# Patient Record
Sex: FEMALE | Race: WHITE | NOT HISPANIC OR LATINO | Employment: OTHER | URBAN - METROPOLITAN AREA
[De-identification: names, ages, dates, MRNs, and addresses within clinical notes are randomized per-mention and may not be internally consistent; named-entity substitution may affect disease eponyms.]

---

## 2017-01-04 ENCOUNTER — RESOLUTE PROFESSIONAL BILLING HOSPITAL PROF FEE (OUTPATIENT)
Dept: HOSPITALIST | Facility: MEDICAL CENTER | Age: 72
End: 2017-01-04
Payer: MEDICARE

## 2017-01-04 ENCOUNTER — HOSPITAL ENCOUNTER (INPATIENT)
Facility: MEDICAL CENTER | Age: 72
LOS: 6 days | DRG: 065 | End: 2017-01-10
Attending: INTERNAL MEDICINE | Admitting: INTERNAL MEDICINE
Payer: MEDICARE

## 2017-01-04 ENCOUNTER — HOSPITAL ENCOUNTER (OUTPATIENT)
Dept: RADIOLOGY | Facility: MEDICAL CENTER | Age: 72
End: 2017-01-04

## 2017-01-04 DIAGNOSIS — I63.9 ACUTE CVA (CEREBROVASCULAR ACCIDENT) (HCC): ICD-10-CM

## 2017-01-04 PROCEDURE — 770020 HCHG ROOM/CARE - TELE (206)

## 2017-01-04 PROCEDURE — 99223 1ST HOSP IP/OBS HIGH 75: CPT | Mod: AI | Performed by: INTERNAL MEDICINE

## 2017-01-04 PROCEDURE — A9270 NON-COVERED ITEM OR SERVICE: HCPCS | Performed by: INTERNAL MEDICINE

## 2017-01-04 PROCEDURE — 700102 HCHG RX REV CODE 250 W/ 637 OVERRIDE(OP): Performed by: INTERNAL MEDICINE

## 2017-01-04 RX ORDER — BISACODYL 10 MG
10 SUPPOSITORY, RECTAL RECTAL
Status: DISCONTINUED | OUTPATIENT
Start: 2017-01-05 | End: 2017-01-10 | Stop reason: HOSPADM

## 2017-01-04 RX ORDER — AMOXICILLIN 250 MG
1 CAPSULE ORAL
Status: DISCONTINUED | OUTPATIENT
Start: 2017-01-05 | End: 2017-01-10 | Stop reason: HOSPADM

## 2017-01-04 RX ORDER — ENEMA 19; 7 G/133ML; G/133ML
1 ENEMA RECTAL
Status: DISCONTINUED | OUTPATIENT
Start: 2017-01-05 | End: 2017-01-10 | Stop reason: HOSPADM

## 2017-01-04 RX ORDER — ONDANSETRON 4 MG/1
4 TABLET, ORALLY DISINTEGRATING ORAL EVERY 4 HOURS PRN
Status: DISCONTINUED | OUTPATIENT
Start: 2017-01-04 | End: 2017-01-10 | Stop reason: HOSPADM

## 2017-01-04 RX ORDER — AMOXICILLIN 250 MG
1 CAPSULE ORAL NIGHTLY
Status: DISCONTINUED | OUTPATIENT
Start: 2017-01-05 | End: 2017-01-10 | Stop reason: HOSPADM

## 2017-01-04 RX ORDER — ATORVASTATIN CALCIUM 40 MG/1
40 TABLET, FILM COATED ORAL
Status: DISCONTINUED | OUTPATIENT
Start: 2017-01-04 | End: 2017-01-05

## 2017-01-04 RX ORDER — DEXTROSE MONOHYDRATE 25 G/50ML
25 INJECTION, SOLUTION INTRAVENOUS
Status: DISCONTINUED | OUTPATIENT
Start: 2017-01-04 | End: 2017-01-10 | Stop reason: HOSPADM

## 2017-01-04 RX ORDER — PAROXETINE HYDROCHLORIDE 20 MG/1
30 TABLET, FILM COATED ORAL DAILY
Status: DISCONTINUED | OUTPATIENT
Start: 2017-01-05 | End: 2017-01-10 | Stop reason: HOSPADM

## 2017-01-04 RX ORDER — GLIPIZIDE 10 MG/1
10 TABLET, FILM COATED, EXTENDED RELEASE ORAL 2 TIMES DAILY WITH MEALS
Status: DISCONTINUED | OUTPATIENT
Start: 2017-01-05 | End: 2017-01-10 | Stop reason: HOSPADM

## 2017-01-04 RX ORDER — CLOPIDOGREL BISULFATE 75 MG/1
75 TABLET ORAL DAILY
Status: DISCONTINUED | OUTPATIENT
Start: 2017-01-05 | End: 2017-01-10 | Stop reason: HOSPADM

## 2017-01-04 RX ORDER — LISINOPRIL 20 MG/1
20 TABLET ORAL DAILY
Status: DISCONTINUED | OUTPATIENT
Start: 2017-01-05 | End: 2017-01-06

## 2017-01-04 RX ORDER — ASPIRIN 325 MG
325 TABLET ORAL DAILY
Status: DISCONTINUED | OUTPATIENT
Start: 2017-01-05 | End: 2017-01-04

## 2017-01-04 RX ORDER — LACTULOSE 20 G/30ML
30 SOLUTION ORAL
Status: DISCONTINUED | OUTPATIENT
Start: 2017-01-05 | End: 2017-01-10 | Stop reason: HOSPADM

## 2017-01-04 RX ORDER — LEVOTHYROXINE SODIUM 0.07 MG/1
75 TABLET ORAL
Status: DISCONTINUED | OUTPATIENT
Start: 2017-01-05 | End: 2017-01-10 | Stop reason: HOSPADM

## 2017-01-04 RX ORDER — AMLODIPINE BESYLATE 5 MG/1
5 TABLET ORAL DAILY
Status: DISCONTINUED | OUTPATIENT
Start: 2017-01-05 | End: 2017-01-05

## 2017-01-04 RX ORDER — ONDANSETRON 2 MG/ML
4 INJECTION INTRAMUSCULAR; INTRAVENOUS EVERY 4 HOURS PRN
Status: DISCONTINUED | OUTPATIENT
Start: 2017-01-04 | End: 2017-01-10 | Stop reason: HOSPADM

## 2017-01-04 RX ORDER — DOCUSATE SODIUM 100 MG/1
100 CAPSULE, LIQUID FILLED ORAL EVERY MORNING
Status: DISCONTINUED | OUTPATIENT
Start: 2017-01-05 | End: 2017-01-10 | Stop reason: HOSPADM

## 2017-01-04 RX ORDER — ACETAMINOPHEN 325 MG/1
650 TABLET ORAL EVERY 6 HOURS PRN
Status: DISCONTINUED | OUTPATIENT
Start: 2017-01-04 | End: 2017-01-10 | Stop reason: HOSPADM

## 2017-01-04 RX ADMIN — ATORVASTATIN CALCIUM 40 MG: 40 TABLET, FILM COATED ORAL at 21:25

## 2017-01-04 ASSESSMENT — LIFESTYLE VARIABLES
ON A TYPICAL DAY WHEN YOU DRINK ALCOHOL HOW MANY DRINKS DO YOU HAVE: 2
HOW MANY TIMES IN THE PAST YEAR HAVE YOU HAD 5 OR MORE DRINKS IN A DAY: 0
ALCOHOL_USE: YES
HAVE YOU EVER FELT YOU SHOULD CUT DOWN ON YOUR DRINKING: NO
CONSUMPTION TOTAL: NEGATIVE
AVERAGE NUMBER OF DAYS PER WEEK YOU HAVE A DRINK CONTAINING ALCOHOL: 0
TOTAL SCORE: 0
EVER HAD A DRINK FIRST THING IN THE MORNING TO STEADY YOUR NERVES TO GET RID OF A HANGOVER: NO
EVER FELT BAD OR GUILTY ABOUT YOUR DRINKING: NO
TOTAL SCORE: 0
HAVE PEOPLE ANNOYED YOU BY CRITICIZING YOUR DRINKING: NO
TOTAL SCORE: 0
EVER_SMOKED: YES

## 2017-01-04 ASSESSMENT — PAIN SCALES - GENERAL: PAINLEVEL_OUTOF10: 3

## 2017-01-04 NOTE — IP AVS SNAPSHOT
1/10/2017          Molly Boyd  [address] [address]    Dear Molly:    [facility] wants to ensure your discharge home is safe and you or your loved ones have had all your questions answered regarding your care after you leave the hospital.    You may receive a telephone call within two days of your discharge.  This call is to make certain you understand your discharge instructions as well as ensure we provided you with the best care possible during your stay with us.     The call will only last approximately 3-5 minutes and will be done by a nurse.    Once again, we want to ensure your discharge home is safe and that you have a clear understanding of any next steps in your care.  If you have any questions or concerns, please do not hesitate to contact us, we are here for you.  Thank you for choosing [facility] for your healthcare needs.    Sincerely,    Roldan Vaughn    [facility]

## 2017-01-04 NOTE — IP AVS SNAPSHOT
[employer] Access Code: [email or web address]  Expires: 2/9/2017 10:04 AM    Your email address is not on file at [email or web address].  Email Addresses are required for you to sign up for [employer], please contact [phone] to verify your personal information and to provide your email address prior to attempting to register for [employer].    Molly Boyd  [address] [place] [address]    [employer]  A secure, online tool to manage your health information     [email or web address]’s [employer]® is a secure, online tool that connects you to your personalized health information from the privacy of your home -- day or night - making it very easy for you to manage your healthcare. Once the activation process is completed, you can even access your medical information using the [employer] nate, which is available for free in the Apple Nate store or Google Play store.     To learn more about [employer], visit [email or web address]    There are two levels of access available (as shown below):   My Chart Features  [facility] Primary Care Doctor [facility]  Specialists [facility]  Urgent  Care Non-[facility] Primary Care Doctor   Email your healthcare team securely and privately 24/7 X X X    Manage appointments: schedule your next appointment; view details of past/upcoming appointments X      Request prescription refills. X      View recent personal medical records, including lab and immunizations X X X X   View health record, including health history, allergies, medications X X X X   Read reports about your outpatient visits, procedures, consult and ER notes X X X X   See your discharge summary, which is a recap of your hospital and/or ER visit that includes your diagnosis, lab results, and care plan X X  X     How to register for [employer]:  Once your e-mail address has been verified, follow the following steps to sign up for [employer].     1. Go to  [email or web address]  2. Click on the Sign Up Now box, which takes you to the New Member Sign Up page. You will  need to provide the following information:  a. Enter your [employer] Access Code exactly as it appears at the top of this page. (You will not need to use this code after you’ve completed the sign-up process. If you do not sign up before the expiration date, you must request a new code.)   b. Enter your date of birth.   c. Enter your home email address.   d. Click Submit, and follow the next screen’s instructions.  3. Create a PlaceFirstt ID. This will be your [employer] login ID and cannot be changed, so think of one that is secure and easy to remember.  4. Create a [employer] password. You can change your password at any time.  5. Enter your Password Reset Question and Answer. This can be used at a later time if you forget your password.   6. Enter your e-mail address. This allows you to receive e-mail notifications when new information is available in [employer].  7. Click Sign Up. You can now view your health information.    For assistance activating your [employer] account, call [phone]

## 2017-01-04 NOTE — IP AVS SNAPSHOT
" <p align=\"LEFT\"><IMG SRC=\"//[email or web address]\" alt=\"Image\" WIDTH=\"50%\" HEIGHT=\"200\" BORDER=\"\"></p>                   Name:Molly Boyd  Medical Record Number:[id number]  CSN: [id number]    YOB: 1945   Age: 71 y.o.  Sex: female  HT:1.702 m (5' 7\") WT: 74.1 kg (163 lb 5.8 oz)          Admit Date: 1/4/2017     Discharge Date:   Today's Date: 1/10/2017  Attending Doctor:  Michael Canales M.D.                  Allergies:  Review of patient's allergies indicates no known allergies.          Follow-up Information     1. Follow up with Dante Pugh M.D..    Specialty:  Family Medicine    Contact information    [facility] [address]  [place] [address]  [phone]          2. Follow up with Dante Pugh M.D..    Specialty:  Family Medicine    Contact information    [facility] [address]  [place] [address]  [phone]           Medication List      Take these Medications        Instructions    * amlodipine 5 MG Tabs   What changed:  Another medication with the same name was added. Make sure you understand how and when to take each.   Commonly known as:  NORVASC    Take 5 mg by mouth every day.   Dose:  5 mg       * amlodipine 5 MG Tabs   What changed:  You were already taking a medication with the same name, and this prescription was added. Make sure you understand how and when to take each.   Commonly known as:  NORVASC    Take 1 Tab by mouth every day.   Dose:  5 mg       aspirin EC 81 MG Tbec   Commonly known as:  ECOTRIN    Take 81 mg by mouth every day.   Dose:  81 mg       atorvastatin 80 MG tablet   Commonly known as:  LIPITOR    Take 1 Tab by mouth every bedtime.   Dose:  80 mg       clopidogrel 75 MG Tabs   Commonly known as:  PLAVIX    Take 1 Tab by mouth every day.   Dose:  75 mg       estradiol 0.5 MG tablet   Commonly known as:  ESTRACE    Take 0.5 mg by mouth every day.   Dose:  0.5 mg       fish oil 1000 MG Caps capsule    Take 1 Cap by mouth 2 Times a Day.   Dose:  1000 mg   "    glipiZIDE 5 MG Tabs   Commonly known as:  GLUCOTROL    Take 5 mg by mouth 2 times a day.   Dose:  5 mg       levothyroxine 75 MCG Tabs   Commonly known as:  SYNTHROID    Take 75 mcg by mouth Every morning on an empty stomach.   Dose:  75 mcg       lisinopril 20 MG Tabs   Commonly known as:  PRINIVIL    Take 20 mg by mouth every day.   Dose:  20 mg       metformin 500 MG Tabs   Commonly known as:  GLUCOPHAGE    Take 500 mg by mouth 2 times a day, with meals.   Dose:  500 mg       multivitamin Tabs    Take 1 Tab by mouth every day.   Dose:  1 Tab       paroxetine 30 MG Tabs   Commonly known as:  PAXIL    Take 30 mg by mouth every day.   Dose:  30 mg       potassium chloride SA 20 MEQ Tbcr   Commonly known as:  K-DUR    Take 20 mEq by mouth every day.   Dose:  20 mEq       * Notice:  This list has 2 medication(s) that are the same as other medications prescribed for you. Read the directions carefully, and ask your doctor or other care provider to review them with you.

## 2017-01-04 NOTE — PROGRESS NOTES
Direct admit from Dr. Perry at [facility]. Dr. Carrillo accepting for Acute Stroke. ADT signed and held 01/04/2017 at 1525, Need to be released upon patient arrival to unit. Patient arriving via Ground ambulance.

## 2017-01-04 NOTE — IP AVS SNAPSHOT
" After Visit Summary                                                                                                                  Name:Molly Boyd  Medical Record Number:[id number]  CSN: [id number]    YOB: 1945   Age: 71 y.o.  Sex: female  HT:1.702 m (5' 7\") WT: 74.1 kg (163 lb 5.8 oz)          Admit Date: 1/4/2017     Discharge Date:   Today's Date: 1/10/2017  Attending Doctor:  Michael Canales M.D.                  Allergies:  Review of patient's allergies indicates no known allergies.            Discharge Instructions       Discharge Instructions    Discharged to home by car with relative. Discharged via wheelchair, hospital escort: Yes.  Special equipment needed: Not Applicable    Be sure to schedule a follow-up appointment with your primary care doctor or any specialists as instructed.     Discharge Plan:   Smoking Cessation Offered: Patient Refused  Influenza Vaccine Indication: Patient Refuses (up to date per pateint)    I understand that a diet low in cholesterol, fat, and sodium is recommended for good health. Unless I have been given specific instructions below for another diet, I accept this instruction as my diet prescription.   Other diet: none specified    Special Instructions: None    · Is patient discharged on Warfarin / Coumadin?   No     · Is patient Post Blood Transfusion?  No    Depression / Suicide Risk    As you are discharged from this [facility] facility, it is important to learn how to keep safe from harming yourself.    Recognize the warning signs:  · Abrupt changes in personality, positive or negative- including increase in energy   · Giving away possessions  · Change in eating patterns- significant weight changes-  positive or negative  · Change in sleeping patterns- unable to sleep or sleeping all the time   · Unwillingness or inability to communicate  · Depression  · Unusual sadness, discouragement and loneliness  · Talk of wanting to die  · Neglect of personal " appearance   · Rebelliousness- reckless behavior  · Withdrawal from people/activities they love  · Confusion- inability to concentrate     If you or a loved one observes any of these behaviors or has concerns about self-harm, here's what you can do:  · Talk about it- your feelings and reasons for harming yourself  · Remove any means that you might use to hurt yourself (examples: pills, rope, extension cords, firearm)  · Get professional help from the community (Mental Health, Substance Abuse, psychological counseling)  · Do not be alone:Call your Safe Contact- someone whom you trust who will be there for you.  · Call your local CRISIS HOTLINE 455-3126 or 319-553-9106  · Call your local Children's Mobile Crisis Response Team [place] (963) 668-8851 or www.Youth Noise  · Call the toll free National Suicide Prevention Hotlines   · National Suicide Prevention Lifeline 234-934-UYRV (4034)  · LookIt Line Network 800-SUICIDE (238-0150)        Follow-up Information     1. Follow up with Dante Pugh M.D..    Specialty:  Family Medicine    Contact information    [address]  [place] [address]  [phone]          2. Follow up with Dante Pugh M.D..    Specialty:  Family Medicine    Contact information    [address]  [place] [address]  [phone]           Discharge Medication Instructions:    Below are the medications your physician expects you to take upon discharge:    Review all your home medications and newly ordered medications with your doctor and/or pharmacist. Follow medication instructions as directed by your doctor and/or pharmacist.    Please keep your medication list with you and share with your physician.               Medication List      START taking these medications        Instructions    atorvastatin 80 MG tablet   Last time this was given:  80 mg on 1/9/2017  7:27 PM   Commonly known as:  LIPITOR    Take 1 Tab by mouth every bedtime.   Dose:  80 mg        clopidogrel 75 MG Tabs   Last time this was given:  75 mg on 1/10/2017  8:09 AM   Commonly known as:  PLAVIX    Take 1 Tab by mouth every day.   Dose:  75 mg       fish oil 1000 MG Caps capsule   Last time this was given:  1,000 mg on 1/10/2017  8:10 AM    Take 1 Cap by mouth 2 Times a Day.   Dose:  1000 mg         CHANGE how you take these medications        Instructions    * amlodipine 5 MG Tabs   What changed:  Another medication with the same name was added. Make sure you understand how and when to take each.   Last time this was given:  5 mg on 1/10/2017  8:09 AM   Commonly known as:  NORVASC    Take 5 mg by mouth every day.   Dose:  5 mg       * amlodipine 5 MG Tabs   What changed:  You were already taking a medication with the same name, and this prescription was added. Make sure you understand how and when to take each.   Last time this was given:  5 mg on 1/10/2017  8:09 AM   Commonly known as:  NORVASC    Take 1 Tab by mouth every day.   Dose:  5 mg       * Notice:  This list has 2 medication(s) that are the same as other medications prescribed for you. Read the directions carefully, and ask your doctor or other care provider to review them with you.      CONTINUE taking these medications        Instructions    aspirin EC 81 MG Tbec   Commonly known as:  ECOTRIN    Take 81 mg by mouth every day.   Dose:  81 mg       estradiol 0.5 MG tablet   Commonly known as:  ESTRACE    Take 0.5 mg by mouth every day.   Dose:  0.5 mg       glipiZIDE 5 MG Tabs   Commonly known as:  GLUCOTROL    Take 5 mg by mouth 2 times a day.   Dose:  5 mg       levothyroxine 75 MCG Tabs   Last time this was given:  75 mcg on 1/10/2017  5:24 AM   Commonly known as:  SYNTHROID    Take 75 mcg by mouth Every morning on an empty stomach.   Dose:  75 mcg       lisinopril 20 MG Tabs   Last time this was given:  20 mg on 1/10/2017  8:10 AM   Commonly known as:  PRINIVIL    Take 20 mg by mouth every day.   Dose:  20 mg       metformin 500 MG  Tabs   Last time this was given:  500 mg on 1/5/2017 11:01 AM   Commonly known as:  GLUCOPHAGE    Take 500 mg by mouth 2 times a day, with meals.   Dose:  500 mg       multivitamin Tabs   Last time this was given:  1 Tab on 1/10/2017  8:10 AM    Take 1 Tab by mouth every day.   Dose:  1 Tab       paroxetine 30 MG Tabs   Last time this was given:  30 mg on 1/10/2017  8:09 AM   Commonly known as:  PAXIL    Take 30 mg by mouth every day.   Dose:  30 mg       potassium chloride SA 20 MEQ Tbcr   Last time this was given:  20 mEq on 1/10/2017  8:10 AM   Commonly known as:  K-DUR    Take 20 mEq by mouth every day.   Dose:  20 mEq         STOP taking these medications     pravastatin 80 MG tablet   Commonly known as:  PRAVACHOL               Instructions           Diet / Nutrition:    Follow any diet instructions given to you by your doctor or the dietician, including how much salt (sodium) you are allowed each day.    If you are overweight, talk to your doctor about a weight reduction plan.    Activity:    Remain physically active following your doctor's instructions about exercise and activity.    Rest often.     Any time you become even a little tired or short of breath, SIT DOWN and rest.    Worsening Symptoms:    Report any of the following signs and symptoms to the doctor's office immediately:    *Pain of jaw, arm, or neck  *Chest pain not relieved by medication                               *Dizziness or loss of consciousness  *Difficulty breathing even when at rest   *More tired than usual                                       *Bleeding drainage or swelling of surgical site  *Swelling of feet, ankles, legs or stomach                 *Fever (>100ºF)  *Pink or blood tinged sputum  *Weight gain (3lbs/day or 5lbs /week)           *Shock from internal defibrillator (if applicable)  *Palpitations or irregular heartbeats                *Cool and/or numb extremities    Stroke Awareness    Common Risk Factors for Stroke  include:    Age  Atrial Fibrillation  Carotid Artery Stenosis  Diabetes Mellitus  Excessive alcohol consumption  High blood pressure  Overweight   Physical inactivity  Smoking    Warning signs and symptoms of a stroke include:    *Sudden numbness or weakness of the face, arm or leg (especially on one side of the body).  *Sudden confusion, trouble speaking or understanding.  *Sudden trouble seeing in one or both eyes.  *Sudden trouble walking, dizziness, loss of balance or coordination.Sudden severe headache with no known cause.    It is very important to get treatment quickly when a stroke occurs. If you experience any of the above warning signs, call 911 immediately.                   Disclaimer         Quit Smoking / Tobacco Use:    I understand the use of any tobacco products increases my chance of suffering from future heart disease or stroke and could cause other illnesses which may shorten my life. Quitting the use of tobacco products is the single most important thing I can do to improve my health. For further information on smoking / tobacco cessation call a Toll Free Quit Line at 1-792.192.4695 (*[employer] [place]) or 1-336.595.5217 (American Lung Association) or you can access the web based program at www.lungusa.org.    [place] Tobacco Users Help Line:  (582) 414-4714       Toll Free: 1-765.725.7710  Quit Tobacco Program [facility] (924)451-7889    Crisis Hotline:    [place] Crisis Hotline:  9-174-WJSPRCF or 1-925.168.4586    [place] Crisis Hotline:    1-445.401.2653 or 484-008-2816    Discharge Survey:   Thank you for choosing [facility]. We hope we did everything we could to make your hospital stay a pleasant one. You may be receiving a phone survey and we would appreciate your time and participation in answering the questions. Your input is very valuable to us in our efforts to improve our service to our patients and their families.        My signature on this form  indicates that:    1. I have reviewed and understand the above information.  2. My questions regarding this information have been answered to my satisfaction.  3. I have formulated a plan with my discharge nurse to obtain my prescribed medications for home.                  Disclaimer         __________________________________                     __________       ________                       Patient Signature                                                 Date                    Time

## 2017-01-05 ENCOUNTER — APPOINTMENT (OUTPATIENT)
Dept: RADIOLOGY | Facility: MEDICAL CENTER | Age: 72
DRG: 065 | End: 2017-01-05
Attending: INTERNAL MEDICINE
Payer: MEDICARE

## 2017-01-05 PROBLEM — E78.5 HYPERLIPIDEMIA: Status: ACTIVE | Noted: 2017-01-05

## 2017-01-05 PROBLEM — I63.9 ACUTE CVA (CEREBROVASCULAR ACCIDENT) (HCC): Status: ACTIVE | Noted: 2017-01-05

## 2017-01-05 PROBLEM — I10 HYPERTENSION: Status: ACTIVE | Noted: 2017-01-05

## 2017-01-05 PROBLEM — E87.6 HYPOKALEMIA: Status: ACTIVE | Noted: 2017-01-05

## 2017-01-05 PROBLEM — E03.9 HYPOTHYROIDISM: Status: ACTIVE | Noted: 2017-01-05

## 2017-01-05 LAB
ANION GAP SERPL CALC-SCNC: 8 MMOL/L (ref 0–11.9)
BASOPHILS # BLD AUTO: 1.2 % (ref 0–1.8)
BASOPHILS # BLD: 0.08 K/UL (ref 0–0.12)
BUN SERPL-MCNC: 10 MG/DL (ref 8–22)
CALCIUM SERPL-MCNC: 9.4 MG/DL (ref 8.5–10.5)
CHLORIDE SERPL-SCNC: 109 MMOL/L (ref 96–112)
CHOLEST SERPL-MCNC: 153 MG/DL (ref 100–199)
CO2 SERPL-SCNC: 23 MMOL/L (ref 20–33)
CREAT SERPL-MCNC: 0.46 MG/DL (ref 0.5–1.4)
EOSINOPHIL # BLD AUTO: 0.29 K/UL (ref 0–0.51)
EOSINOPHIL NFR BLD: 4.3 % (ref 0–6.9)
ERYTHROCYTE [DISTWIDTH] IN BLOOD BY AUTOMATED COUNT: 50.4 FL (ref 35.9–50)
EST. AVERAGE GLUCOSE BLD GHB EST-MCNC: 146 MG/DL
GFR SERPL CREATININE-BSD FRML MDRD: >60 ML/MIN/1.73 M 2
GLUCOSE SERPL-MCNC: 103 MG/DL (ref 65–99)
HBA1C MFR BLD: 6.7 % (ref 0–5.6)
HCT VFR BLD AUTO: 41.5 % (ref 37–47)
HDLC SERPL-MCNC: 45 MG/DL
HGB BLD-MCNC: 12.6 G/DL (ref 12–16)
IMM GRANULOCYTES # BLD AUTO: 0.03 K/UL (ref 0–0.11)
IMM GRANULOCYTES NFR BLD AUTO: 0.4 % (ref 0–0.9)
LDLC SERPL CALC-MCNC: 76 MG/DL
LYMPHOCYTES # BLD AUTO: 2.74 K/UL (ref 1–4.8)
LYMPHOCYTES NFR BLD: 41 % (ref 22–41)
MAGNESIUM SERPL-MCNC: 1.9 MG/DL (ref 1.5–2.5)
MCH RBC QN AUTO: 22.3 PG (ref 27–33)
MCHC RBC AUTO-ENTMCNC: 30.4 G/DL (ref 33.6–35)
MCV RBC AUTO: 73.5 FL (ref 81.4–97.8)
MONOCYTES # BLD AUTO: 0.55 K/UL (ref 0–0.85)
MONOCYTES NFR BLD AUTO: 8.2 % (ref 0–13.4)
NEUTROPHILS # BLD AUTO: 3 K/UL (ref 2–7.15)
NEUTROPHILS NFR BLD: 44.9 % (ref 44–72)
NRBC # BLD AUTO: 0 K/UL
NRBC BLD AUTO-RTO: 0 /100 WBC
PLATELET # BLD AUTO: 311 K/UL (ref 164–446)
PMV BLD AUTO: 9.8 FL (ref 9–12.9)
POTASSIUM SERPL-SCNC: 3.4 MMOL/L (ref 3.6–5.5)
RBC # BLD AUTO: 5.65 M/UL (ref 4.2–5.4)
SODIUM SERPL-SCNC: 140 MMOL/L (ref 135–145)
TRIGL SERPL-MCNC: 158 MG/DL (ref 0–149)
WBC # BLD AUTO: 6.7 K/UL (ref 4.8–10.8)

## 2017-01-05 PROCEDURE — 93880 EXTRACRANIAL BILAT STUDY: CPT

## 2017-01-05 PROCEDURE — 93306 TTE W/DOPPLER COMPLETE: CPT | Mod: 26 | Performed by: INTERNAL MEDICINE

## 2017-01-05 PROCEDURE — 92610 EVALUATE SWALLOWING FUNCTION: CPT

## 2017-01-05 PROCEDURE — G8979 MOBILITY GOAL STATUS: HCPCS | Mod: CI

## 2017-01-05 PROCEDURE — A9270 NON-COVERED ITEM OR SERVICE: HCPCS | Performed by: INTERNAL MEDICINE

## 2017-01-05 PROCEDURE — 83036 HEMOGLOBIN GLYCOSYLATED A1C: CPT

## 2017-01-05 PROCEDURE — G8996 SWALLOW CURRENT STATUS: HCPCS | Mod: CJ

## 2017-01-05 PROCEDURE — G8987 SELF CARE CURRENT STATUS: HCPCS | Mod: CJ

## 2017-01-05 PROCEDURE — 36415 COLL VENOUS BLD VENIPUNCTURE: CPT

## 2017-01-05 PROCEDURE — 97162 PT EVAL MOD COMPLEX 30 MIN: CPT

## 2017-01-05 PROCEDURE — 700102 HCHG RX REV CODE 250 W/ 637 OVERRIDE(OP): Performed by: INTERNAL MEDICINE

## 2017-01-05 PROCEDURE — 93306 TTE W/DOPPLER COMPLETE: CPT

## 2017-01-05 PROCEDURE — 70551 MRI BRAIN STEM W/O DYE: CPT

## 2017-01-05 PROCEDURE — 80061 LIPID PANEL: CPT

## 2017-01-05 PROCEDURE — 99233 SBSQ HOSP IP/OBS HIGH 50: CPT | Performed by: INTERNAL MEDICINE

## 2017-01-05 PROCEDURE — G8978 MOBILITY CURRENT STATUS: HCPCS | Mod: CJ

## 2017-01-05 PROCEDURE — 97165 OT EVAL LOW COMPLEX 30 MIN: CPT

## 2017-01-05 PROCEDURE — 85025 COMPLETE CBC W/AUTO DIFF WBC: CPT

## 2017-01-05 PROCEDURE — 83735 ASSAY OF MAGNESIUM: CPT

## 2017-01-05 PROCEDURE — 80048 BASIC METABOLIC PNL TOTAL CA: CPT

## 2017-01-05 PROCEDURE — 770020 HCHG ROOM/CARE - TELE (206)

## 2017-01-05 PROCEDURE — G8988 SELF CARE GOAL STATUS: HCPCS | Mod: CI

## 2017-01-05 PROCEDURE — G8997 SWALLOW GOAL STATUS: HCPCS | Mod: CH

## 2017-01-05 PROCEDURE — 700111 HCHG RX REV CODE 636 W/ 250 OVERRIDE (IP): Performed by: INTERNAL MEDICINE

## 2017-01-05 PROCEDURE — 93880 EXTRACRANIAL BILAT STUDY: CPT | Mod: 26 | Performed by: SURGERY

## 2017-01-05 PROCEDURE — 700112 HCHG RX REV CODE 229: Performed by: INTERNAL MEDICINE

## 2017-01-05 RX ORDER — ATORVASTATIN CALCIUM 80 MG/1
80 TABLET, FILM COATED ORAL
Status: DISCONTINUED | OUTPATIENT
Start: 2017-01-05 | End: 2017-01-10 | Stop reason: HOSPADM

## 2017-01-05 RX ORDER — GLIPIZIDE 5 MG/1
5 TABLET ORAL 2 TIMES DAILY
COMMUNITY

## 2017-01-05 RX ORDER — ESTRADIOL 0.5 MG/1
0.5 TABLET ORAL DAILY
COMMUNITY

## 2017-01-05 RX ORDER — CHLORAL HYDRATE 500 MG
1000 CAPSULE ORAL 2 TIMES DAILY
Status: DISCONTINUED | OUTPATIENT
Start: 2017-01-05 | End: 2017-01-10 | Stop reason: HOSPADM

## 2017-01-05 RX ORDER — POTASSIUM CHLORIDE 20 MEQ/1
40 TABLET, EXTENDED RELEASE ORAL ONCE
Status: COMPLETED | OUTPATIENT
Start: 2017-01-05 | End: 2017-01-05

## 2017-01-05 RX ORDER — PRAVASTATIN SODIUM 80 MG/1
80 TABLET ORAL NIGHTLY
Status: ON HOLD | COMMUNITY
End: 2017-01-07

## 2017-01-05 RX ADMIN — GLIPIZIDE 10 MG: 10 TABLET, FILM COATED, EXTENDED RELEASE ORAL at 11:00

## 2017-01-05 RX ADMIN — DOCUSATE SODIUM 100 MG: 100 CAPSULE ORAL at 10:59

## 2017-01-05 RX ADMIN — GLIPIZIDE 10 MG: 10 TABLET, FILM COATED, EXTENDED RELEASE ORAL at 18:43

## 2017-01-05 RX ADMIN — THERA TABS 1 TABLET: TAB at 10:59

## 2017-01-05 RX ADMIN — AMLODIPINE BESYLATE 5 MG: 5 TABLET ORAL at 11:00

## 2017-01-05 RX ADMIN — ENOXAPARIN SODIUM 40 MG: 100 INJECTION SUBCUTANEOUS at 10:59

## 2017-01-05 RX ADMIN — CLOPIDOGREL 75 MG: 75 TABLET, FILM COATED ORAL at 11:00

## 2017-01-05 RX ADMIN — POTASSIUM CHLORIDE 40 MEQ: 1500 TABLET, EXTENDED RELEASE ORAL at 16:31

## 2017-01-05 RX ADMIN — LISINOPRIL 20 MG: 20 TABLET ORAL at 11:01

## 2017-01-05 RX ADMIN — PAROXETINE HYDROCHLORIDE 30 MG: 20 TABLET, FILM COATED ORAL at 11:01

## 2017-01-05 RX ADMIN — METFORMIN HYDROCHLORIDE 500 MG: 500 TABLET, FILM COATED ORAL at 11:01

## 2017-01-05 ASSESSMENT — PAIN SCALES - GENERAL
PAINLEVEL_OUTOF10: 0
PAINLEVEL_OUTOF10: 1
PAINLEVEL_OUTOF10: 0

## 2017-01-05 ASSESSMENT — ACTIVITIES OF DAILY LIVING (ADL): TOILETING: INDEPENDENT

## 2017-01-05 ASSESSMENT — GAIT ASSESSMENTS
DISTANCE (FEET): 20
GAIT LEVEL OF ASSIST: MINIMAL ASSIST
ASSISTIVE DEVICE: HAND HELD ASSIST

## 2017-01-05 NOTE — CARE PLAN
Problem: Safety  Goal: Will remain free from injury  Patient educated about safety safety and fall precaution.  Discussed patient's fall risk assessment with health care team.  Educated about call light use.  Hourly rounding in practice.    Problem: Knowledge Deficit  Goal: Knowledge of disease process/condition, treatment plan, diagnostic tests, and medications will improve  Reviewing plan of care, activities, and medication with patient.  Encouraging patient to ask questions and participate in plan of care.  Providing answers to all questions.  Continuing with current plan of care.  Hourly rounding in practice.

## 2017-01-05 NOTE — PROGRESS NOTES
Patient arrived via gurney with AMANDASA.  Patient is AOx4, has right arm weakness (3/5), right leg weakness and numbness (1/5), able to move left side, right sided weakness improving compared to last evening.  Trace right facial droop present. Reviewed plan of care, call light within reach, bed alarm active, hourly rounding.  NPO as patient failed swallow evaluation.

## 2017-01-05 NOTE — PROGRESS NOTES
Pt is A&Ox4, VSS, NAD. Denies pain, states she's very fatigued today. Seen by therapies, diet updated. 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

## 2017-01-05 NOTE — PROGRESS NOTES
Med rec complete per patient.  Patient denies taking antibiotics within last month.  Allergies reviewed.

## 2017-01-05 NOTE — THERAPY
"Occupational Therapy Evaluation completed.   Functional Status:  Min A supine > EOB, CGA transfers without AD, CGA toileting/standing oral care/LB dressing   Plan of Care: Will benefit from Occupational Therapy 3 times per week  Discharge Recommendations:  Equipment: Will Continue to Assess for Equipment Needs. Post-acute therapy to be determined based on progress.  See \"Rehab Therapy-Acute\" Patient Summary Report for complete documentation.    71 y.o. female who presented with R-sided weakness, drooling R side of mouth, gait disturbance. Pt did not seek medical attention for 3 days due to concern over leaving  alone. Pt is primary CG for  who requires assist with all I-ADL. Pt dx with small L pontine CVA. Presents with RUE mild weakness, decreased balance, decreased safety awareness. Pt adamantly refuses post-acute rehab placement, but is open to HH. OT educated pt on importance of planning for future caregiving needs if goal is to age in place. Pt states she understands, but \"needs to get home to take care of him\". Pt has adult children who live in [place] and cannot provide assist. She has not contacted them regarding current hospitalization. Pt would benefit from acute OT and possible post-acute services depending on progress.      "

## 2017-01-05 NOTE — PROGRESS NOTES
Hospital Medicine Progress Note, Adult, Complex               Author: Alejandro ALIDA La Date & Time created: 1/5/2017  11:53 AM     71/F with h/o CVA 6 years ago with no neurologic deficits, T2DM, HTN, and HLD, admitted for right sided weakness x 3 days, with some drooling from the side of the mouth. Initial blood work-up was negative. CT head was NAD. Started on plavix, statin.     Interval History:  1/5/2017 - no overnight events. Remains hemodynamically stable and afebrile. Saturating well on RA. K 3.4. No leukocytosis. HDL 45, LDL 76. Brain MRI showed small sized left paramedian pontine acute to subacute infarct.      > Seen and examined. (+) slight weakness on the RUE and RLE, with right facial droop and drooling on the right side. (+) slurred sppech. No CP< SOB, nausea, vomiting, abd pain. Resistant to going to SNF, amenable to HHC.       Review of Systems:  ROS   Pertinent positives/negatives as mentioned above.     A complete review of systems was done. All other systems were negative.       Physical Exam:  Physical Exam   Constitutional: She is oriented to person, place, and time. She appears well-developed and well-nourished. No distress.   HENT:   Head: Normocephalic and atraumatic.   Mouth/Throat: No oropharyngeal exudate.   Eyes: Conjunctivae are normal. Pupils are equal, round, and reactive to light. No scleral icterus.   Neck: Normal range of motion. Neck supple.   Cardiovascular: Normal rate and regular rhythm.  Exam reveals no gallop and no friction rub.    No murmur heard.  Pulmonary/Chest: Effort normal and breath sounds normal. No respiratory distress. She has no wheezes. She has no rales. She exhibits no tenderness.   Abdominal: Soft. Bowel sounds are normal. She exhibits no distension. There is no tenderness. There is no rebound and no guarding.   Musculoskeletal: Normal range of motion. She exhibits no edema or tenderness.   Lymphadenopathy:     She has no cervical adenopathy.   Neurological: She  is alert and oriented to person, place, and time. A cranial nerve deficit (right facial droop) is present.   (+) slight weakness on RUE and RLE   Skin: Skin is warm and dry. No rash noted. No erythema. No pallor.   Psychiatric: She has a normal mood and affect. Her behavior is normal. Judgment and thought content normal.   Nursing note and vitals reviewed.      Labs:        Invalid input(s): [id number]  Recent Labs      17   125   TROPONINI  0.07     Recent Labs      17   1250  17   0319   SODIUM  142  140   POTASSIUM  3.9  3.4*   CHLORIDE  107  109   CO2  24  23   BUN  12  10   CREATININE  0.7  0.46*   CALCIUM  9.7  9.4     Recent Labs      170  17   031   ALTSGPT  21   --    ASTSGOT  16   --    ALKPHOSPHAT  87   --    TBILIRUBIN  0.2   --    GLUCOSE  190*  103*     Recent Labs      170  17   031   RBC  5.84*  5.65*   HEMOGLOBIN  13.1  12.6   HEMATOCRIT  43.5  41.5   PLATELETCT  328  311     Recent Labs      170  17   0319   WBC  6.5  6.7   NEUTSPOLYS   --   44.90   LYMPHOCYTES   --   41.00   MONOCYTES   --   8.20   EOSINOPHILS   --   4.30   BASOPHILS   --   1.20   ASTSGOT  16   --    ALTSGPT  21   --    ALKPHOSPHAT  87   --    TBILIRUBIN  0.2   --            Hemodynamics:  Temp (24hrs), Av.4 °C (97.6 °F), Min:36 °C (96.8 °F), Max:37 °C (98.6 °F)  Temperature: 35.9 °C (96.6 °F)  Pulse  Av  Min: 52  Max: 65   Blood Pressure : 155/57 mmHg     Respiratory:    Respiration: 18, Pulse Oximetry: 95 %           Fluids:  No intake or output data in the 24 hours ending 17 1153  Weight: 74.1 kg (163 lb 5.8 oz)  GI/Nutrition:  Orders Placed This Encounter   Procedures   • Diet Order     Standing Status: Standing      Number of Occurrences: 1      Standing Expiration Date:      Order Specific Question:  Diet:     Answer:  Consistent Carbohydrate [4]     Medical Decision Making, by Problem:  Active Problems:      Acute CVA  (cerebrovascular accident) (HCC)   - continue plavix as failed ASA. Change lipitor to high intensity dose at 80mg HS.  - continue neurochecks q4H. D/C norvasc for now and continue lisinopril. Do permissive HTN and allow BP as high as 220/120 for now.   - complete work-up with MRA brain, echo and carotid US. Continue telemetry to monitro for arrhythmia.   - PT/OT/SLP eval for discharge planning. Pt resistant to SNF. Will need minimum of HHC.      Hypertension  - hold norvasc for now, and continue lisinopril. Permissive HTN for now due to acute CVA.       Hypokalemia  - replace with 40mEq PO KCl. Check Mg level. BMP in AM.       Hyperlipidemia  - lipitor as above.       Hypothyroidism  - continue synthroid.           Labs reviewed, Medications reviewed and Radiology images reviewed  Fritz catheter: No Fritz      DVT Prophylaxis: Enoxaparin (Lovenox)    Ulcer prophylaxis: Not indicated    Assessed for rehab: Patient was assess for and/or received rehabilitation services during this hospitalization

## 2017-01-05 NOTE — FACE TO FACE
Face to Face Supporting Documentation - Home Health    The encounter with this patient was in whole or in part the primary reason for home health admission.    Date of encounter:   Patient:                    MRN:                       YOB: 2019  Molly Boyd  [id number]  1945     Home health to see patient for:  Skilled Nursing care for assessment, interventions & education, Physical Therapy evaluation and treatment, Speech Language Pathology evaluation and treatment and Occupational therapy evaluation and treatment    Skilled need for:  New Onset Medical Diagnosis Acute CVA    Skilled nursing interventions to include:  Comment:    Skilled nursing care for assessment, intervention and education.       Homebound status evidenced by:  Need the aid of supportive devices such as crutches, canes, wheelchairs or walkers. Leaving home requires a considerable and taxing effort. There is a normal inability to leave the home.    Community Physician to provide follow up care: [place] Pugh M.D.     Optional Interventions? No      I certify the face to face encounter for this home health care referral meets the CMS requirements and the encounter/clinical assessment with the patient was, in whole, or in part, for the medical condition(s) listed above, which is the primary reason for home health care. Based on my clinical findings: the service(s) are medically necessary, support the need for home health care, and the homebound criteria are met.  I certify that this patient has had a face to face encounter by myself.  Alejandro Winters M.D. - NPI: [id number]

## 2017-01-05 NOTE — THERAPY
"Physical Therapy Evaluation completed.   Bed Mobility:   Min A for back to bed, unable to lift feet to mattress on her own.   Transfers: Sit to Stand: Minimal Assist  Gait: Level Of Assist: Minimal Assist with hand held assist today x 20 feet, but need trial of FWW when pt able to tolerate longer ambulation. Pt reports fear of R knee buckling as it has done previously. Pt declines to attempt longer ambulation today, reports too tired to walk in hallway.   Plan of Care: Will benefit from Physical Therapy 4 times per week  Discharge Recommendations: Equipment: No Equipment Needed. Post-acute therapy recommended before discharged home.(depending on progress--pt is caregiver for disabled , though he can get himself up and around at home, she does all chores at home including all driving.)     See \"Rehab Therapy-Acute\" Patient Summary Report for complete documentation.     "

## 2017-01-05 NOTE — H&P
IDENTIFICATION:  The patient is a 71-year-old female patient of Dr. Pugh   in [place], [place].    CHIEF COMPLAINT:  Right-sided weakness for 3 days.    HISTORY OF PRESENT ILLNESS:  The patient is a 71-year-old female who states   that 3 days ago, she woke up and noted her right side was weak.  She is   normally right-handed and she was trying to use her phone to communicate with   her sister and was unable to use the stylet properly with her right hand.  She   also noted she was having some drooling from the right side of her mouth and   was having some cramps in her right thigh and having difficulty ambulating.    She states she could not walk properly.  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    Today, she went to the [place] [facility] [place] and there due   to concerns of stroke was then transferred to our facility for further   evaluation as they do not have any resources for stroke workup.  Currently, she   denies any headache, nausea, vomiting, diarrhea, light headedness or   dizziness.    REVIEW OF SYSTEMS:  A 10-point review of systems reviewed and otherwise   negative.    PAST MEDICAL HISTORY:  Cerebrovascular accident 6 years ago with complete   recovery and no neurologic deficits, type 2 diabetes mellitus, hypertension,   and dyslipidemia.    OUTPATIENT MEDICATIONS:  Lisinopril 20 mg daily, metformin 500 mg twice daily,   potassium chloride 20 mEq daily, glipizide 10 mg twice daily, aspirin 81 mg   daily, multivitamin once daily, Synthroid 75 mcg daily, Pravachol 80 mg daily,   Estrace 0.5 mg daily, Norvasc 5 mg daily and Paxil 30 mg daily.    DRUG ALLERGIES:  None.    SOCIAL HISTORY:  Patient smokes one half pack per day up to one pack per day and   has smoked for over 40 years.  She drinks alcohol only very rarely and lives   with her   who she is a caregiver for.    FAMILY HISTORY:  No family history of stroke.    PHYSICAL EXAMINATION:  VITAL SIGNS:  Temperature 97.7, blood pressure 151/63, heart rate 63,   respiratory rate 18, and oxygen saturation 94% on room air.  GENERAL:  Patient is a pleasant female.  She is sitting comfortably in no   apparent distress.  HEENT:  Head atraumatic.  Extraocular movements intact.  Sclerae clear.    Conjunctivae pink.  She does have a right facial droop.  Speech is slightly   slurred.  NECK:  Supple.  No jugular venous distention.  Trachea midline.  No anterior,   posterior cervical or supraclavicular lymphadenopathy.  HEART:  Regular rate and rhythm, no murmur.  Point of maximal impulse   nondisplaced.  LUNGS:  Clear to auscultation bilaterally.  Good breath sounds to the bases.  CHEST:  Symmetric with respiration.  ABDOMEN:  Soft, nontender, nondistended, normoactive bowel sounds.  No   palpable hepatosplenomegaly.  EXTREMITIES:  No clubbing, cyanosis or edema.  Pedal pulses 2+ bilaterally.  NEUROLOGIC:  Patient is alert and oriented x3.  She has no tremor on exam.    Strength in the right upper and lower extremity is 4/5.  Strength in the left   upper and lower extremities 5/5.  When talking, she does use her hand to   articulate, but only her left hand.  She has some right hemineglect.    LABORATORY DATA:  WBC 6.5, hemoglobin 13.1 and platelets 328.  Sodium 142,   potassium is 3.9, BUN 12 and creatinine 0.7.    IMAGING:  CT scan of the head done without contrast shows no evidence of   bleed.  There is remote infarct involving the deep white matter of the left   insula.    ASSESSMENT AND PLAN:  Acute cerebrovascular accident with ongoing neurologic   deficits.  The patient's deficits have been present for almost 72 hours now.    Patient will be admitted to the hospital.  I will treat her with Plavix in place of  aspirin.  She was on aspirin at home and did fail this therapy.    I will switch her  Pravachol to Lipitor, as she has failed Pravachol   therapy and I will hold her esterase as this has prothrombotic properties.  I   will order for MRI of the brain to evaluate her for acute ischemic stroke more   thoroughly.  She does have evidence of stroke on CT scan; however, this could   be related to her old stroke she had 6 years ago.  I will also order for a   lipid panel, physical therapy, occupational therapy and speech therapy.  I did   advise the patient against any further tobacco use as this contributes to her   risk for stroke as well.  Patient will need greater than 2 midnights stay for   treatment of her acute stroke as well as full workup in rehabilitation   services as well as physical therapy evaluation.       ____________________________________     MD DAHIANA CINTRON / NTS    DD:  01/04/2017 20:51:16  DT:  01/04/2017 21:43:12    D#:  [id number]  Job#:  [id number]

## 2017-01-06 ENCOUNTER — APPOINTMENT (OUTPATIENT)
Dept: RADIOLOGY | Facility: MEDICAL CENTER | Age: 72
DRG: 065 | End: 2017-01-06
Attending: INTERNAL MEDICINE
Payer: MEDICARE

## 2017-01-06 LAB
ANION GAP SERPL CALC-SCNC: 8 MMOL/L (ref 0–11.9)
BASOPHILS # BLD AUTO: 1.6 % (ref 0–1.8)
BASOPHILS # BLD: 0.09 K/UL (ref 0–0.12)
BUN SERPL-MCNC: 11 MG/DL (ref 8–22)
CALCIUM SERPL-MCNC: 9.2 MG/DL (ref 8.5–10.5)
CHLORIDE SERPL-SCNC: 110 MMOL/L (ref 96–112)
CO2 SERPL-SCNC: 22 MMOL/L (ref 20–33)
CREAT SERPL-MCNC: 0.47 MG/DL (ref 0.5–1.4)
EOSINOPHIL # BLD AUTO: 0.3 K/UL (ref 0–0.51)
EOSINOPHIL NFR BLD: 5.2 % (ref 0–6.9)
ERYTHROCYTE [DISTWIDTH] IN BLOOD BY AUTOMATED COUNT: 50.1 FL (ref 35.9–50)
GFR SERPL CREATININE-BSD FRML MDRD: >60 ML/MIN/1.73 M 2
GLUCOSE SERPL-MCNC: 123 MG/DL (ref 65–99)
HCT VFR BLD AUTO: 41.5 % (ref 37–47)
HGB BLD-MCNC: 12.6 G/DL (ref 12–16)
IMM GRANULOCYTES # BLD AUTO: 0.02 K/UL (ref 0–0.11)
IMM GRANULOCYTES NFR BLD AUTO: 0.3 % (ref 0–0.9)
LV EJECT FRACT  99904: 60
LV EJECT FRACT MOD 2C 99903: 69.91
LV EJECT FRACT MOD 4C 99902: 59.45
LV EJECT FRACT MOD BP 99901: 62.19
LYMPHOCYTES # BLD AUTO: 2.31 K/UL (ref 1–4.8)
LYMPHOCYTES NFR BLD: 40.2 % (ref 22–41)
MCH RBC QN AUTO: 22.4 PG (ref 27–33)
MCHC RBC AUTO-ENTMCNC: 30.4 G/DL (ref 33.6–35)
MCV RBC AUTO: 73.7 FL (ref 81.4–97.8)
MONOCYTES # BLD AUTO: 0.51 K/UL (ref 0–0.85)
MONOCYTES NFR BLD AUTO: 8.9 % (ref 0–13.4)
NEUTROPHILS # BLD AUTO: 2.52 K/UL (ref 2–7.15)
NEUTROPHILS NFR BLD: 43.8 % (ref 44–72)
NRBC # BLD AUTO: 0 K/UL
NRBC BLD AUTO-RTO: 0 /100 WBC
PLATELET # BLD AUTO: 323 K/UL (ref 164–446)
PMV BLD AUTO: 10.4 FL (ref 9–12.9)
POTASSIUM SERPL-SCNC: 3.6 MMOL/L (ref 3.6–5.5)
RBC # BLD AUTO: 5.63 M/UL (ref 4.2–5.4)
SODIUM SERPL-SCNC: 140 MMOL/L (ref 135–145)
WBC # BLD AUTO: 5.8 K/UL (ref 4.8–10.8)

## 2017-01-06 PROCEDURE — 700102 HCHG RX REV CODE 250 W/ 637 OVERRIDE(OP): Performed by: INTERNAL MEDICINE

## 2017-01-06 PROCEDURE — 36415 COLL VENOUS BLD VENIPUNCTURE: CPT

## 2017-01-06 PROCEDURE — 80048 BASIC METABOLIC PNL TOTAL CA: CPT

## 2017-01-06 PROCEDURE — 700112 HCHG RX REV CODE 229: Performed by: INTERNAL MEDICINE

## 2017-01-06 PROCEDURE — 97116 GAIT TRAINING THERAPY: CPT

## 2017-01-06 PROCEDURE — G8996 SWALLOW CURRENT STATUS: HCPCS | Mod: CI

## 2017-01-06 PROCEDURE — A9270 NON-COVERED ITEM OR SERVICE: HCPCS | Performed by: INTERNAL MEDICINE

## 2017-01-06 PROCEDURE — 70450 CT HEAD/BRAIN W/O DYE: CPT

## 2017-01-06 PROCEDURE — 92526 ORAL FUNCTION THERAPY: CPT

## 2017-01-06 PROCEDURE — 700111 HCHG RX REV CODE 636 W/ 250 OVERRIDE (IP): Performed by: INTERNAL MEDICINE

## 2017-01-06 PROCEDURE — 92611 MOTION FLUOROSCOPY/SWALLOW: CPT

## 2017-01-06 PROCEDURE — 70544 MR ANGIOGRAPHY HEAD W/O DYE: CPT

## 2017-01-06 PROCEDURE — 99233 SBSQ HOSP IP/OBS HIGH 50: CPT | Performed by: INTERNAL MEDICINE

## 2017-01-06 PROCEDURE — G8997 SWALLOW GOAL STATUS: HCPCS | Mod: CH

## 2017-01-06 PROCEDURE — 770006 HCHG ROOM/CARE - MED/SURG/GYN SEMI*

## 2017-01-06 PROCEDURE — 85025 COMPLETE CBC W/AUTO DIFF WBC: CPT

## 2017-01-06 PROCEDURE — 97530 THERAPEUTIC ACTIVITIES: CPT

## 2017-01-06 PROCEDURE — 302118 SHAMPOO,NO RINSE: Performed by: INTERNAL MEDICINE

## 2017-01-06 PROCEDURE — 95951 EEG: CPT | Mod: 52

## 2017-01-06 RX ADMIN — LEVOTHYROXINE SODIUM 75 MCG: 75 TABLET ORAL at 05:13

## 2017-01-06 RX ADMIN — Medication 1000 MG: at 20:55

## 2017-01-06 RX ADMIN — Medication 1000 MG: at 02:07

## 2017-01-06 RX ADMIN — ACETAMINOPHEN 650 MG: 325 TABLET, FILM COATED ORAL at 22:52

## 2017-01-06 RX ADMIN — ATORVASTATIN CALCIUM 80 MG: 80 TABLET, FILM COATED ORAL at 02:07

## 2017-01-06 RX ADMIN — Medication 1000 MG: at 09:00

## 2017-01-06 RX ADMIN — THERA TABS 1 TABLET: TAB at 09:37

## 2017-01-06 RX ADMIN — ATORVASTATIN CALCIUM 80 MG: 80 TABLET, FILM COATED ORAL at 20:55

## 2017-01-06 RX ADMIN — GLIPIZIDE 10 MG: 10 TABLET, FILM COATED, EXTENDED RELEASE ORAL at 17:30

## 2017-01-06 RX ADMIN — CLOPIDOGREL 75 MG: 75 TABLET, FILM COATED ORAL at 09:38

## 2017-01-06 RX ADMIN — PAROXETINE HYDROCHLORIDE 10 MG: 20 TABLET, FILM COATED ORAL at 11:16

## 2017-01-06 RX ADMIN — Medication 1 TABLET: at 20:55

## 2017-01-06 RX ADMIN — GLIPIZIDE 10 MG: 10 TABLET, FILM COATED, EXTENDED RELEASE ORAL at 09:37

## 2017-01-06 RX ADMIN — DOCUSATE SODIUM 100 MG: 100 CAPSULE ORAL at 09:37

## 2017-01-06 RX ADMIN — ACETAMINOPHEN 650 MG: 325 TABLET, FILM COATED ORAL at 09:30

## 2017-01-06 RX ADMIN — PAROXETINE HYDROCHLORIDE 20 MG: 20 TABLET, FILM COATED ORAL at 09:31

## 2017-01-06 RX ADMIN — Medication 1 TABLET: at 02:07

## 2017-01-06 RX ADMIN — LISINOPRIL 20 MG: 20 TABLET ORAL at 09:37

## 2017-01-06 RX ADMIN — ACETAMINOPHEN 650 MG: 325 TABLET, FILM COATED ORAL at 16:34

## 2017-01-06 RX ADMIN — ENOXAPARIN SODIUM 40 MG: 100 INJECTION SUBCUTANEOUS at 09:36

## 2017-01-06 ASSESSMENT — GAIT ASSESSMENTS
GAIT LEVEL OF ASSIST: MINIMAL ASSIST
ASSISTIVE DEVICE: FRONT WHEEL WALKER
DISTANCE (FEET): 40

## 2017-01-06 ASSESSMENT — PAIN SCALES - GENERAL
PAINLEVEL_OUTOF10: 2
PAINLEVEL_OUTOF10: 2
PAINLEVEL_OUTOF10: 0
PAINLEVEL_OUTOF10: 5
PAINLEVEL_OUTOF10: 6
PAINLEVEL_OUTOF10: 2
PAINLEVEL_OUTOF10: 6

## 2017-01-06 NOTE — PROGRESS NOTES
Patient is sitting up in the chair.  Educated on safety precautions, not getting up without staff assistance.  She states understanding of the need for a chair alarm but continues to refuse.

## 2017-01-06 NOTE — THERAPY
"Speech Language Therapy dysphagia treatment completed.   Functional Status:  The patient was seen for dysphagia therapy this date. The patient was seen at conclusion of her D2/thin liquid meal tray. The patient consumed thin liquids with no overt s/s of aspiration however exhibited immediate cough following 100% of fruit trials. Patient reported \"some\" coughing during her dinner and breakfast tray however, could not provide any further information regarding textures or frequency. Given inconsistent s/s concerning for aspiration recommend FEES evaluation to r/o aspiration and further assess oropharyngeal swallow function.     Recommendations: 1) NPO pending further diagnostic swallow evaluation. FEES orders received and will be completed later this date.     Plan of Care: Will benefit from Speech Therapy 5 times per week    See \"Rehab Therapy-Acute\" Patient Summary Report for complete documentation.     "

## 2017-01-06 NOTE — PROGRESS NOTES
Hospital Medicine Progress Note, Adult, Complex               Author: Alejandrokristin Winters Date & Time created: 1/6/2017  1:46 PM     71/F with h/o CVA 6 years ago with no neurologic deficits, T2DM, HTN, and HLD, admitted for right sided weakness x 3 days, with some drooling from the side of the mouth. Initial blood work-up was negative. CT head was NAD. Started on plavix, statin.     Interval History:  1/6/2017 - uneventful night. VSS. Afebrile. Carotid duplex and echo unremarkable. BMP unimpressive. Had issues with zoning out while working with PT/OT. Increased weakness on the right.     > Seen and examined. (+) right sided weakness and droop. No CP, SOB, nausea, vomiting, abd pain. Still resistant to SNF placement.       Review of Systems:  ROS   Pertinent positives/negatives as mentioned above.     A complete review of systems was done. All other systems were negative.       Physical Exam:  Physical Exam   Constitutional: She is oriented to person, place, and time. She appears well-developed and well-nourished. No distress.   HENT:   Head: Normocephalic and atraumatic.   Mouth/Throat: No oropharyngeal exudate.   Eyes: Conjunctivae are normal. Pupils are equal, round, and reactive to light. No scleral icterus.   Neck: Normal range of motion. Neck supple.   Cardiovascular: Normal rate and regular rhythm.  Exam reveals no gallop and no friction rub.    No murmur heard.  Pulmonary/Chest: Effort normal and breath sounds normal. No respiratory distress. She has no wheezes. She has no rales. She exhibits no tenderness.   Abdominal: Soft. Bowel sounds are normal. She exhibits no distension. There is no tenderness. There is no rebound and no guarding.   Musculoskeletal: Normal range of motion. She exhibits no edema or tenderness.   Lymphadenopathy:     She has no cervical adenopathy.   Neurological: She is alert and oriented to person, place, and time. A cranial nerve deficit (right facial droop) is present.   (+) slight  increase in weakness on RUE and RLE  (+) right facial droop   Skin: Skin is warm and dry. No rash noted. No erythema. No pallor.   Psychiatric: She has a normal mood and affect. Her behavior is normal. Judgment and thought content normal.   Nursing note and vitals reviewed.      Labs:        Invalid input(s): [id number]  Recent Labs      17   1250   TROPONINI  0.07     Recent Labs      17   1250  17   0319  17   1211  17   0213   SODIUM  142  140   --   140   POTASSIUM  3.9  3.4*   --   3.6   CHLORIDE  107  109   --   110   CO2  24  23   --   22   BUN  12  10   --   11   CREATININE  0.7  0.46*   --   0.47*   MAGNESIUM   --    --   1.9   --    CALCIUM  9.7  9.4   --   9.2     Recent Labs      17   1250  17   0319  17   0213   ALTSGPT  21   --    --    ASTSGOT  16   --    --    ALKPHOSPHAT  87   --    --    TBILIRUBIN  0.2   --    --    GLUCOSE  190*  103*  123*     Recent Labs      17   1250  17   0319  17   0213   RBC  5.84*  5.65*  5.63*   HEMOGLOBIN  13.1  12.6  12.6   HEMATOCRIT  43.5  41.5  41.5   PLATELETCT  328  311  323     Recent Labs      17   1250  17   0319  17   0213   WBC  6.5  6.7  5.8   NEUTSPOLYS   --   44.90  43.80*   LYMPHOCYTES   --   41.00  40.20   MONOCYTES   --   8.20  8.90   EOSINOPHILS   --   4.30  5.20   BASOPHILS   --   1.20  1.60   ASTSGOT  16   --    --    ALTSGPT  21   --    --    ALKPHOSPHAT  87   --    --    TBILIRUBIN  0.2   --    --            Hemodynamics:  Temp (24hrs), Av.3 °C (97.3 °F), Min:36 °C (96.8 °F), Max:36.5 °C (97.7 °F)  Temperature: 36.3 °C (97.3 °F)  Pulse  Av.8  Min: 50  Max: 65   Blood Pressure : 144/77 mmHg     Respiratory:    Respiration: 18, Pulse Oximetry: 93 %        RUL Breath Sounds: Clear, RML Breath Sounds: Clear, RLL Breath Sounds: Clear, WENDIE Breath Sounds: Clear, LLL Breath Sounds: Clear  Fluids:    Intake/Output Summary (Last 24 hours) at 17 1346  Last  data filed at 01/05/17 1700   Gross per 24 hour   Intake    550 ml   Output      0 ml   Net    550 ml        GI/Nutrition:  Orders Placed This Encounter   Procedures   • Diet Order     Standing Status: Standing      Number of Occurrences: 1      Standing Expiration Date:      Order Specific Question:  Diet:     Answer:  Consistent Carbohydrate [4]     Order Specific Question:  Texture/Fiber modifications:     Answer:  Dysphagia 3(Mechanical Soft)specify fluid consistency(question 6) [3]     Order Specific Question:  Consistency/Fluid modifications:     Answer:  Thin Liquids [3]     Medical Decision Making, by Problem:  Active Problems:      Acute CVA (cerebrovascular accident) (HCC)   - continue plavix as failed ASA. Continue high intensity lipitor 80mg HS.  - continue neurochecks q4H. Continue permissive HTN and allow BP as high as 220/120 for now.   - repeat CT head to r/o hemorrhagic transformation with acute neurologic change. Will get EEG to r/o post-CVA seizures.   - pending MRA brain. Continue telemetry to monitro for arrhythmia.   - Continue PT/OT/SLP. Will get inpatient rehab to evaluate.       Hypertension  - hold norvasc and lisinopril for now. Permissive HTN for now due to acute CVA.       Hypokalemia  - replaced. BMP in AM.       Hyperlipidemia  - lipitor as above.       Hypothyroidism  - continue synthroid.           Labs reviewed, Medications reviewed and Radiology images reviewed  Fritz catheter: No Fritz      DVT Prophylaxis: Enoxaparin (Lovenox)    Ulcer prophylaxis: Not indicated    Assessed for rehab: Patient was assess for and/or received rehabilitation services during this hospitalization

## 2017-01-06 NOTE — CARE PLAN
Problem: Safety  Goal: Will remain free from injury  Intervention: Provide assistance with mobility  Instructed pt to notify staff when needing to ambulate to reduce fall risk.      Problem: Pain Management  Goal: Pain level will decrease to patient’s comfort goal  Intervention: Follow pain managment plan developed in collaboration with patient and Interdisciplinary Team  Instruct pt to notify staff when pain levels are rising or not resolving so that treatment may be given.

## 2017-01-06 NOTE — DISCHARGE PLANNING
PMR referral chart review:      Medical Decision Making, by Problem:  Active Problems:      Acute CVA (cerebrovascular accident) (HCC)    - continue plavix as failed ASA. Change lipitor to high intensity dose at 80mg HS.  - continue neurochecks q4H. D/C norvasc for now and continue lisinopril. Do permissive HTN and allow BP as high as 220/120 for now.    - complete work-up with MRA brain, echo and carotid US. Continue telemetry to monitro for arrhythmia.    - PT/OT/SLP eval for discharge planning. Pt resistant to SNF. Will need minimum of HHC.      Hypertension  - hold norvasc for now, and continue lisinopril. Permissive HTN for now due to acute CVA.       Hypokalemia  - replace with 40mEq PO KCl. Check Mg level. BMP in AM.       Hyperlipidemia  - lipitor as above.       Hypothyroidism  - continue synthroid.           Labs reviewed, Medications reviewed and Radiology images reviewed  Fritz catheter: No Fritz  DVT Prophylaxis: Enoxaparin (Lovenox)  Ulcer prophylaxis: Not indicated  Assessed for rehab: Patient was assess for and/or received rehabilitation services during this hospitalization    TORRES Workman. Speech Language Pathologist Signed  Therapy 1/6/2017 11:02 AM      Expand All Collapse All    Speech Language Therapy FEES completed.  Functional Status: The patient was seen for FEES evaluation this date. FEES procedure completed. Upon insertion of scope, patient was noted to have slightly sluggish left arytenoids and left vocal fold however complete adduction was noted during breath hold, phonation and immediately preceding swallow. Presentation of PO consisted of ice chips, nectars, purees, thin liquids, soft solids (fruit cocktail). The patient presented with functional orophryngeal  swallow with no overt penetration or aspiration noted during entire study. Of note, patient did exhibit immediate cough follow trials of soft solids and purees (intermittently) however cough did not appear directly  "related to either penetration or aspiration. The patient was instructed on s/s to monitor for as well as swallow strategies to utilize. At this time, recommend upgrade to D3/thin liquids to assist with swallow strategies. Patient instructed on and completed simple oral motor exercises and laryngeal elevation exercises this session. Please hold Po and notify SLP with any difficulty or change in status.      Recommendations - Diet:  Dysphagia III; thin liquids                          Strategies: Monitor during meals and Head of Bed at 90 Degrees                          Medication Administration: Float Whole with Puree; whole with liquid wash  Plan of Care: Will benefit from Speech Therapy 5 times per week    See \"Rehab Therapy-Acute\" Patient Summary Report for complete documentation.                       Hannah Rogers O.T. Occupational Therapist Signed  Therapy 1/5/2017  2:38 PM      Expand All Collapse All    Occupational Therapy Evaluation completed.   Functional Status:  Min A supine > EOB, CGA transfers without AD, CGA toileting/standing oral care/LB dressing           Plan of Care: Will benefit from Occupational Therapy 3 times per week  Discharge Recommendations:  Equipment: Will Continue to Assess for Equipment Needs. Post-acute therapy to be determined based on progress.  See \"Rehab Therapy-Acute\" Patient Summary Report for complete documentation.    71 y.o. female who presented with R-sided weakness, drooling R side of mouth, gait disturbance. Pt did not seek medical attention for 3 days due to concern over leaving  alone. Pt is primary CG for  who requires assist with all I-ADL. Pt dx with small L pontine CVA. Presents with RUE mild weakness, decreased balance, decreased safety awareness. Pt adamantly refuses post-acute rehab placement, but is open to HH. OT educated pt on importance of planning for future caregiving needs if goal is to age in place. Pt states she understands, but " "\"needs to get home to take care of him\". Pt has adult children who live in [place] and cannot provide assist. She has not contacted them regarding current hospitalization. Pt would benefit from acute OT and possible post-acute services depending on progress.                          Jennifer Gibbs P.T. Physical Therapist Signed  Therapy 1/5/2017 10:39 AM      Expand All Collapse All    Physical Therapy Evaluation completed.   Bed Mobility:   Min A for back to bed, unable to lift feet to mattress on her own.    Transfers: Sit to Stand: Minimal Assist  Gait: Level Of Assist: Minimal Assist with hand held assist today x 20 feet, but need trial of FWW when pt able to tolerate longer ambulation. Pt reports fear of R knee buckling as it has done previously. Pt declines to attempt longer ambulation today, reports too tired to walk in hallway.   Plan of Care: Will benefit from Physical Therapy 4 times per week  Discharge Recommendations: Equipment: No Equipment Needed. Post-acute therapy recommended before discharged home.(depending on progress--pt is caregiver for disabled , though he can get himself up and around at home, she does all chores at home including all driving.)     See \"Rehab Therapy-Acute\" Patient Summary Report for complete documentation.                     Chart review indicates acute stroke with ongoing medical need . Therapy notes indicate continues therapy intervention. Will have physiatry consult .        "

## 2017-01-06 NOTE — THERAPY
"Speech Language Therapy FEES completed.  Functional Status: The patient was seen for FEES evaluation this date. FEES procedure completed. Upon insertion of scope, patient was noted to have slightly sluggish left arytenoids and left vocal fold however complete adduction was noted during breath hold, phonation and immediately preceding swallow. Presentation of PO consisted of ice chips, nectars, purees, thin liquids, soft solids (fruit cocktail). The patient presented with functional orophryngeal  swallow with no overt penetration or aspiration noted during entire study. Of note, patient did exhibit immediate cough follow trials of soft solids and purees (intermittently) however cough did not appear directly related to either penetration or aspiration. The patient was instructed on s/s to monitor for as well as swallow strategies to utilize. At this time, recommend upgrade to D3/thin liquids to assist with swallow strategies. Patient instructed on and completed simple oral motor exercises and laryngeal elevation exercises this session. Please hold Po and notify SLP with any difficulty or change in status.     Recommendations - Diet:  Dysphagia III; thin liquids                          Strategies: Monitor during meals and Head of Bed at 90 Degrees                          Medication Administration: Float Whole with Puree; whole with liquid wash  Plan of Care: Will benefit from Speech Therapy 5 times per week    See \"Rehab Therapy-Acute\" Patient Summary Report for complete documentation.   "

## 2017-01-06 NOTE — DISCHARGE PLANNING
"TCN met with patient at bedside to discuss her transitional care options. Patient strongly stated she was going home \"no matter what\". TCN provided information regarding acute rehab vs SNF vs HH. Patient is agreeable to HH but reported under no circumstance would she go to a facility for continued care. Patient lives in [place] and is the primary care giver for her . Patient states she has family and friend assistance 24/7 for the next week to assist with her care and her husbands care. [facility] selected [facility] Choice signed and faxed to [facility]. Patient is aware of pending rehab consult. TCN to follow as needed for DC planning. SW aware of patient transitional care choice.   "

## 2017-01-06 NOTE — THERAPY
"Speech Language Therapy Clinical Swallow Evaluation completed.  Functional Status: The patient was seen for clinical swallow evaluation this date. The patient was awake, alert and oriented x4. The patient was noted to have right labial asymmetry with slow speech and imprecise articulation during diadochokinetic task. The patient denied any difficulty with her regular lunchg tray however, when asked specific questions pertaining to meal items she reported increased difficulty with certain items. The patient was given PO trials of ice chips, nectars, purees, thin liquids and soft solids. The patient presented with mild oral dysphagia with noted use of an oral hold strategy despite training, as well as s/s concerning for penetration/aspiration on mixed consistency trials. Thin liquids via cup sip and straw resulted in no overt s/s of aspiration this session. Patient was provided extensive education regarding aspiration risk, signs to monitor for and possible need for further diagnostic swallow evaluation to r/o aspiration. At this time, recommend downgrade to D2/thin liquids with strict swallow strategies. SLP Following closely.     Recommendations - Diet: Dysphagia II, Thin Liquid                          Strategies: Monitor during meals, No Straws and Head of Bed at 90 Degrees                          Medication Administration:  Float Whole with Puree  Plan of Care: Will benefit from Speech Therapy 5 times per week    See \"Rehab Therapy-Acute\" Patient Summary Report for complete documentation.   "

## 2017-01-06 NOTE — CARE PLAN
Progressing as expected    Problem: Safety  Goal: Will remain free from injury  Patient educated about safety safety and fall precaution.  Discussed patient's fall risk assessment with health care team.  Educated about call light use.  Hourly rounding in practice.    Problem: Knowledge Deficit  Goal: Knowledge of disease process/condition, treatment plan, diagnostic tests, and medications will improve  Reviewing plan of care, activities, and medication with patient.  Encouraging patient to ask questions and participate in plan of care.  Providing answers to all questions.  Continuing with current plan of care.  Hourly rounding in practice.

## 2017-01-06 NOTE — PROGRESS NOTES
Patient is AOx4, has right arm weakness (3/5), right leg weakness and numbness (1/5), able to move left side, right sided weakness improving compared to last evening.  Trace right facial droop present. Reviewed plan of care, call light within reach, bed alarm active, hourly rounding.

## 2017-01-06 NOTE — PROGRESS NOTES
Assumed care of patient at 0700.  Received bedside report from night shift RN.  Introduced self to patient.  Patient comfortable and denies further needs at this time.

## 2017-01-06 NOTE — PROGRESS NOTES
Monitor Summary: SB 54-SR 63, AR 0.20, QRS 0.10, QT 0.40 with rare PVC per strip from monitor room.

## 2017-01-07 PROBLEM — E87.6 HYPOKALEMIA: Status: RESOLVED | Noted: 2017-01-05 | Resolved: 2017-01-07

## 2017-01-07 LAB
ANION GAP SERPL CALC-SCNC: 8 MMOL/L (ref 0–11.9)
BUN SERPL-MCNC: 12 MG/DL (ref 8–22)
CALCIUM SERPL-MCNC: 9.2 MG/DL (ref 8.5–10.5)
CHLORIDE SERPL-SCNC: 109 MMOL/L (ref 96–112)
CO2 SERPL-SCNC: 25 MMOL/L (ref 20–33)
CREAT SERPL-MCNC: 0.66 MG/DL (ref 0.5–1.4)
GFR SERPL CREATININE-BSD FRML MDRD: >60 ML/MIN/1.73 M 2
GLUCOSE SERPL-MCNC: 141 MG/DL (ref 65–99)
POTASSIUM SERPL-SCNC: 3.5 MMOL/L (ref 3.6–5.5)
SODIUM SERPL-SCNC: 142 MMOL/L (ref 135–145)

## 2017-01-07 PROCEDURE — 700112 HCHG RX REV CODE 229: Performed by: INTERNAL MEDICINE

## 2017-01-07 PROCEDURE — A9270 NON-COVERED ITEM OR SERVICE: HCPCS | Performed by: INTERNAL MEDICINE

## 2017-01-07 PROCEDURE — 700102 HCHG RX REV CODE 250 W/ 637 OVERRIDE(OP): Performed by: INTERNAL MEDICINE

## 2017-01-07 PROCEDURE — 80048 BASIC METABOLIC PNL TOTAL CA: CPT

## 2017-01-07 PROCEDURE — 302118 SHAMPOO,NO RINSE: Performed by: INTERNAL MEDICINE

## 2017-01-07 PROCEDURE — 770006 HCHG ROOM/CARE - MED/SURG/GYN SEMI*

## 2017-01-07 PROCEDURE — 99233 SBSQ HOSP IP/OBS HIGH 50: CPT | Performed by: INTERNAL MEDICINE

## 2017-01-07 PROCEDURE — 700111 HCHG RX REV CODE 636 W/ 250 OVERRIDE (IP): Performed by: INTERNAL MEDICINE

## 2017-01-07 PROCEDURE — 92526 ORAL FUNCTION THERAPY: CPT

## 2017-01-07 PROCEDURE — 36415 COLL VENOUS BLD VENIPUNCTURE: CPT

## 2017-01-07 RX ORDER — CHLORAL HYDRATE 500 MG
1000 CAPSULE ORAL 2 TIMES DAILY
Qty: 90 CAP | Refills: 2 | Status: SHIPPED | OUTPATIENT
Start: 2017-01-07

## 2017-01-07 RX ORDER — ATORVASTATIN CALCIUM 80 MG/1
80 TABLET, FILM COATED ORAL
Qty: 30 TAB | Refills: 2 | Status: SHIPPED | OUTPATIENT
Start: 2017-01-07 | End: 2017-01-10

## 2017-01-07 RX ORDER — CLOPIDOGREL BISULFATE 75 MG/1
75 TABLET ORAL DAILY
Qty: 30 TAB | Refills: 2 | Status: SHIPPED | OUTPATIENT
Start: 2017-01-07 | End: 2017-01-10

## 2017-01-07 RX ORDER — POTASSIUM CHLORIDE 20 MEQ/1
20 TABLET, EXTENDED RELEASE ORAL DAILY
Status: DISCONTINUED | OUTPATIENT
Start: 2017-01-07 | End: 2017-01-10 | Stop reason: HOSPADM

## 2017-01-07 RX ADMIN — CLOPIDOGREL 75 MG: 75 TABLET, FILM COATED ORAL at 09:02

## 2017-01-07 RX ADMIN — GLIPIZIDE 10 MG: 10 TABLET, FILM COATED, EXTENDED RELEASE ORAL at 09:02

## 2017-01-07 RX ADMIN — Medication 1000 MG: at 19:08

## 2017-01-07 RX ADMIN — POTASSIUM CHLORIDE 20 MEQ: 1500 TABLET, EXTENDED RELEASE ORAL at 18:19

## 2017-01-07 RX ADMIN — LEVOTHYROXINE SODIUM 75 MCG: 75 TABLET ORAL at 05:58

## 2017-01-07 RX ADMIN — ATORVASTATIN CALCIUM 80 MG: 80 TABLET, FILM COATED ORAL at 19:07

## 2017-01-07 RX ADMIN — GLIPIZIDE 10 MG: 10 TABLET, FILM COATED, EXTENDED RELEASE ORAL at 18:19

## 2017-01-07 RX ADMIN — PAROXETINE HYDROCHLORIDE 30 MG: 20 TABLET, FILM COATED ORAL at 09:02

## 2017-01-07 RX ADMIN — Medication 1000 MG: at 09:02

## 2017-01-07 RX ADMIN — THERA TABS 1 TABLET: TAB at 09:01

## 2017-01-07 RX ADMIN — ENOXAPARIN SODIUM 40 MG: 100 INJECTION SUBCUTANEOUS at 09:00

## 2017-01-07 RX ADMIN — DOCUSATE SODIUM 100 MG: 100 CAPSULE ORAL at 09:01

## 2017-01-07 RX ADMIN — Medication 1 TABLET: at 19:08

## 2017-01-07 ASSESSMENT — PAIN SCALES - GENERAL
PAINLEVEL_OUTOF10: 0
PAINLEVEL_OUTOF10: 0

## 2017-01-07 NOTE — DISCHARGE PLANNING
Per FINN Jones, [facility] is not staffed on the weekends for admissions. SW notified RN that we will not be able to confirm HH acceptance until Monday, so it's up to MD if he would like to DC pt at this point. FINN Jones stated that no other home health agencies that service [place] will be open today either.

## 2017-01-07 NOTE — THERAPY
"Speech Language Therapy dysphagia treatment completed.   Functional Status:  The patient was seen for dysphagia therapy this date, after RN noted some concerns over night. The patient was seen during her D3/thin liquid breakfast tray. Patient initially reported poor PO intake d/t \"not feeling hungry\" but later consumed an entire bowl of cheerios with milk with no overt s/s of aspiration, coughing or difficulty. Patient continues to do best with thin liquids. FEES results were discussed with RN and patient.     Recommendations:1)  Continue D3/thin liquids. Please hold PO with any change in respiratory status. Consider CXR to r/o developing process.     Plan of Care: Will benefit from Speech Therapy 5 times per week    See \"Rehab Therapy-Acute\" Patient Summary Report for complete documentation.     "

## 2017-01-07 NOTE — PROGRESS NOTES
Pt resting in bed with eyes closed. Denies needs or pain at this time. Pt has called appropriately to use the restroom at night and has needed a minimal hand held assist to ambulate to restroom.   Pt has crackles in RUL, RML of lung after taking pills in applesauce, crackles diminish slightly after pt coughs. Pt encouraged to sit up completely to take pills, double swallow and tuck chin.

## 2017-01-07 NOTE — CARE PLAN
Problem: Safety  Goal: Will remain free from injury  Intervention: Provide assistance with mobility  Instruct pt to notify staff when needing to ambulate to reduce fall risk.      Problem: Infection  Goal: Will remain free from infection  Intervention: Implement standard precautions and perform hand washing before and after patient contact  Instruct pt/family on standard precautions to help prevent spread of infection/germs.

## 2017-01-07 NOTE — PROGRESS NOTES
Pt educated regarding the benefits of having a bed alarm and risks associated with not having a bed alarm. Continues to refuse.

## 2017-01-07 NOTE — PROGRESS NOTES
Monitor summary: SB-SR 52-67, MD .20, QRS .08, QT .40, with rare PVCs per strip from monitor room.

## 2017-01-07 NOTE — CARE PLAN
Problem: Safety  Goal: Will remain free from falls  Outcome: PROGRESSING AS EXPECTED  Intervention: Implement fall precautions  Pt continues to refuse strip bed alarm, built in bed alarm is on. Pt has called appropriately during shift.      Problem: Bowel/Gastric:  Goal: Normal bowel function is maintained or improved  Outcome: PROGRESSING SLOWER THAN EXPECTED  Intervention: Educate patient and significant other/support system about diet, fluid intake, medications and activity to promote bowel function  Pt educated about diet, fluid intake, medications and activity to promote bowel function.   Intervention: Educate patient and significant other/support system about signs and symptoms of constipation and interventions to implement  Pt educated about signs and symptoms of constipation and interventions to implement.

## 2017-01-08 LAB
ANION GAP SERPL CALC-SCNC: 4 MMOL/L (ref 0–11.9)
BUN SERPL-MCNC: 10 MG/DL (ref 8–22)
CALCIUM SERPL-MCNC: 9.4 MG/DL (ref 8.5–10.5)
CHLORIDE SERPL-SCNC: 109 MMOL/L (ref 96–112)
CO2 SERPL-SCNC: 27 MMOL/L (ref 20–33)
CREAT SERPL-MCNC: 0.69 MG/DL (ref 0.5–1.4)
GFR SERPL CREATININE-BSD FRML MDRD: >60 ML/MIN/1.73 M 2
GLUCOSE SERPL-MCNC: 145 MG/DL (ref 65–99)
POTASSIUM SERPL-SCNC: 3.9 MMOL/L (ref 3.6–5.5)
SODIUM SERPL-SCNC: 140 MMOL/L (ref 135–145)

## 2017-01-08 PROCEDURE — 99232 SBSQ HOSP IP/OBS MODERATE 35: CPT | Performed by: INTERNAL MEDICINE

## 2017-01-08 PROCEDURE — 700111 HCHG RX REV CODE 636 W/ 250 OVERRIDE (IP): Performed by: INTERNAL MEDICINE

## 2017-01-08 PROCEDURE — 700102 HCHG RX REV CODE 250 W/ 637 OVERRIDE(OP): Performed by: INTERNAL MEDICINE

## 2017-01-08 PROCEDURE — A9270 NON-COVERED ITEM OR SERVICE: HCPCS | Performed by: INTERNAL MEDICINE

## 2017-01-08 PROCEDURE — 770006 HCHG ROOM/CARE - MED/SURG/GYN SEMI*

## 2017-01-08 PROCEDURE — 80048 BASIC METABOLIC PNL TOTAL CA: CPT

## 2017-01-08 PROCEDURE — 36415 COLL VENOUS BLD VENIPUNCTURE: CPT

## 2017-01-08 RX ORDER — LISINOPRIL 20 MG/1
20 TABLET ORAL DAILY
Status: DISCONTINUED | OUTPATIENT
Start: 2017-01-08 | End: 2017-01-10 | Stop reason: HOSPADM

## 2017-01-08 RX ADMIN — Medication 1000 MG: at 08:49

## 2017-01-08 RX ADMIN — LEVOTHYROXINE SODIUM 75 MCG: 75 TABLET ORAL at 06:04

## 2017-01-08 RX ADMIN — GLIPIZIDE 10 MG: 10 TABLET, FILM COATED, EXTENDED RELEASE ORAL at 11:37

## 2017-01-08 RX ADMIN — LISINOPRIL 20 MG: 20 TABLET ORAL at 14:18

## 2017-01-08 RX ADMIN — ENOXAPARIN SODIUM 40 MG: 100 INJECTION SUBCUTANEOUS at 08:49

## 2017-01-08 RX ADMIN — CLOPIDOGREL 75 MG: 75 TABLET, FILM COATED ORAL at 08:48

## 2017-01-08 RX ADMIN — POTASSIUM CHLORIDE 20 MEQ: 1500 TABLET, EXTENDED RELEASE ORAL at 08:49

## 2017-01-08 RX ADMIN — Medication 1000 MG: at 20:35

## 2017-01-08 RX ADMIN — PAROXETINE HYDROCHLORIDE 30 MG: 20 TABLET, FILM COATED ORAL at 08:48

## 2017-01-08 RX ADMIN — ATORVASTATIN CALCIUM 80 MG: 80 TABLET, FILM COATED ORAL at 20:35

## 2017-01-08 RX ADMIN — THERA TABS 1 TABLET: TAB at 08:48

## 2017-01-08 RX ADMIN — GLIPIZIDE 10 MG: 10 TABLET, FILM COATED, EXTENDED RELEASE ORAL at 17:39

## 2017-01-08 ASSESSMENT — PAIN SCALES - GENERAL
PAINLEVEL_OUTOF10: 0
PAINLEVEL_OUTOF10: 0

## 2017-01-08 NOTE — PROGRESS NOTES
Hospital Medicine Progress Note, Adult, Complex               Author: Alejandro ALIDA Vianey Date & Time created: 1/7/2017  4:47 PM     71/F with h/o CVA 6 years ago with no neurologic deficits, T2DM, HTN, and HLD, admitted for right sided weakness x 3 days, with some drooling from the side of the mouth. Initial blood work-up was negative. CT head was NAD. Started on plavix, statin. Carotid duplex and echo unremarkable.    Interval History:  1/7/2017 - no overnight events. Remains hemodynamically stable and afebrile. MRI of the head showed small size left paramedian pontine acute to subacute infarct, with chronic infarct with encephalomalacia involving the left basal ganglia, left corona radiata, and left centrum semiovale. K 3.5. No hemorrhagic transformation on CT.     > Seen and examined. No complaints. No CP, SOB, nausea, vomiting, abd pain. No new neurologic complaints. Still with right sided weakness and facial droop.      Review of Systems:  ROS   Pertinent positives/negatives as mentioned above.     A complete review of systems was done. All other systems were negative.       Physical Exam:  Physical Exam   Constitutional: She is oriented to person, place, and time. She appears well-developed and well-nourished. No distress.   HENT:   Head: Normocephalic and atraumatic.   Mouth/Throat: No oropharyngeal exudate.   Eyes: Conjunctivae are normal. Pupils are equal, round, and reactive to light. No scleral icterus.   Neck: Normal range of motion. Neck supple.   Cardiovascular: Normal rate and regular rhythm.  Exam reveals no gallop and no friction rub.    No murmur heard.  Pulmonary/Chest: Effort normal and breath sounds normal. No respiratory distress. She has no wheezes. She has no rales. She exhibits no tenderness.   Abdominal: Soft. Bowel sounds are normal. She exhibits no distension. There is no tenderness. There is no rebound and no guarding.   Musculoskeletal: Normal range of motion. She exhibits no edema or  tenderness.   Lymphadenopathy:     She has no cervical adenopathy.   Neurological: She is alert and oriented to person, place, and time. A cranial nerve deficit (right facial droop) is present.   (+) slight increase in weakness on RUE and RLE  (+) right facial droop   Skin: Skin is warm and dry. No rash noted. No erythema. No pallor.   Psychiatric: She has a normal mood and affect. Her behavior is normal. Judgment and thought content normal.   Nursing note and vitals reviewed.      Labs:        Invalid input(s): [id number]      Recent Labs      17   1211  17   0213  17   0302   SODIUM  140   --   140  142   POTASSIUM  3.4*   --   3.6  3.5*   CHLORIDE  109   --   110  109   CO2  23   --   22  25   BUN  10   --   11  12   CREATININE  0.46*   --   0.47*  0.66   MAGNESIUM   --   1.9   --    --    CALCIUM  9.4   --   9.2  9.2     Recent Labs      17   0213  17   0302   GLUCOSE  103*  123*  141*     Recent Labs      17   0213   RBC  5.65*  5.63*   HEMOGLOBIN  12.6  12.6   HEMATOCRIT  41.5  41.5   PLATELETCT  311  323     Recent Labs      17   0213   WBC  6.7  5.8   NEUTSPOLYS  44.90  43.80*   LYMPHOCYTES  41.00  40.20   MONOCYTES  8.20  8.90   EOSINOPHILS  4.30  5.20   BASOPHILS  1.20  1.60           Hemodynamics:  Temp (24hrs), Av.3 °C (97.3 °F), Min:35.9 °C (96.7 °F), Max:36.5 °C (97.7 °F)  Temperature: 36.5 °C (97.7 °F)  Pulse  Av  Min: 50  Max: 65   Blood Pressure : 150/46 mmHg     Respiratory:    Respiration: 17, Pulse Oximetry: 94 %        RUL Breath Sounds: Clear, RML Breath Sounds: Clear, RLL Breath Sounds: Diminished;Clear, WENDIE Breath Sounds: Clear;Expiratory Wheezes, LLL Breath Sounds: Diminished  Fluids:  No intake or output data in the 24 hours ending 17 3143     GI/Nutrition:  Orders Placed This Encounter   Procedures   • Diet Order     Standing Status: Standing      Number of  Occurrences: 1      Standing Expiration Date:      Order Specific Question:  Diet:     Answer:  Consistent Carbohydrate [4]     Order Specific Question:  Texture/Fiber modifications:     Answer:  Dysphagia 3(Mechanical Soft)specify fluid consistency(question 6) [3]     Order Specific Question:  Consistency/Fluid modifications:     Answer:  Thin Liquids [3]     Medical Decision Making, by Problem:  Active Problems:      Acute CVA (cerebrovascular accident) (HCC)   - continue plavix. Continue high intensity lipitor 80mg HS.  - continue neurochecks q4H. Continue permissive HTN and allow BP as high as 220/120 for now, reintroduce home BP meds in next 1-2 days.  - Await EEG to r/o post-CVA seizures.   - Continue telemetry to monitro for arrhythmia.   - Continue PT/OT/SLP. Accepted by inpatient rehab, but patient declines. Await [facility] to be arranged.          Hypertension  - hold norvasc and lisinopril for now. Permissive HTN for now due to acute CVA. Reintroduce home BP meds in next 1-2 days.      Hypokalemia  - start kdur 20mEq daily. BMP in AM.       Hyperlipidemia  - lipitor as above.       Hypothyroidism  - continue synthroid.           Labs reviewed, Medications reviewed and Radiology images reviewed  Fritz catheter: No Fritz      DVT Prophylaxis: Enoxaparin (Lovenox)    Ulcer prophylaxis: Not indicated    Assessed for rehab: Patient was assess for and/or received rehabilitation services during this hospitalization

## 2017-01-08 NOTE — CARE PLAN
Problem: Safety  Goal: Will remain free from injury  Outcome: PROGRESSING AS EXPECTED  Hourly rounding implemented, patient currently receiving plavix and lovenox, stroke scale complete, bed locked and in lowest position, personal items/call light within reach, bed alarm refused, treaded socks on patient.    Problem: Discharge Barriers/Planning  Goal: Patient’s continuum of care needs will be met  Intervention: Assess potential discharge barriers on admission and throughout hospital stay  Patient pending discharge to home with home health.

## 2017-01-08 NOTE — CARE PLAN
Problem: Communication  Goal: The ability to communicate needs accurately and effectively will improve  Outcome: PROGRESSING AS EXPECTED  Pt oriented to call light system and able to make needs known     Problem: Safety  Goal: Will remain free from falls  Outcome: PROGRESSING AS EXPECTED  Pt up with SBA, instructed to call for assistance with ambulation

## 2017-01-08 NOTE — PROGRESS NOTES
Report received from LAYLA Meyer. Pt is AAOx4, no family at bedside. Assessment completed. R sided weakness, lunds diminished at bases, steady gait. Pt denies pain. Pt ambulates independently. Pt educated regarding plan of care, pending discharge to home with HH. All questions answered. Call light and personal belongings in reach. No additional needs at this time. Bed alarm refused. Patient demonstrates appropriate use of the call light.

## 2017-01-08 NOTE — PROGRESS NOTES
Pt AAOx4, KEE. Right sided weakness present in upper and lower extremities and right facial droop droop noted. Decreased sensation noted in right side as well.Tolerating Dys 3, NTL diet well. Up to BR with SBA. Call light in reach.

## 2017-01-09 ENCOUNTER — APPOINTMENT (OUTPATIENT)
Dept: RADIOLOGY | Facility: MEDICAL CENTER | Age: 72
DRG: 065 | End: 2017-01-09
Attending: INTERNAL MEDICINE
Payer: MEDICARE

## 2017-01-09 LAB — DEPRECATED D DIMER PPP IA-ACNC: <200 NG/ML(D-DU)

## 2017-01-09 PROCEDURE — 770006 HCHG ROOM/CARE - MED/SURG/GYN SEMI*

## 2017-01-09 PROCEDURE — 71010 DX-CHEST-PORTABLE (1 VIEW): CPT

## 2017-01-09 PROCEDURE — 700102 HCHG RX REV CODE 250 W/ 637 OVERRIDE(OP): Performed by: INTERNAL MEDICINE

## 2017-01-09 PROCEDURE — A9270 NON-COVERED ITEM OR SERVICE: HCPCS | Performed by: INTERNAL MEDICINE

## 2017-01-09 PROCEDURE — 85379 FIBRIN DEGRADATION QUANT: CPT

## 2017-01-09 PROCEDURE — 99232 SBSQ HOSP IP/OBS MODERATE 35: CPT | Performed by: INTERNAL MEDICINE

## 2017-01-09 PROCEDURE — 700111 HCHG RX REV CODE 636 W/ 250 OVERRIDE (IP): Performed by: INTERNAL MEDICINE

## 2017-01-09 PROCEDURE — 36415 COLL VENOUS BLD VENIPUNCTURE: CPT

## 2017-01-09 RX ORDER — AMLODIPINE BESYLATE 5 MG/1
5 TABLET ORAL DAILY
Status: DISCONTINUED | OUTPATIENT
Start: 2017-01-10 | End: 2017-01-10 | Stop reason: HOSPADM

## 2017-01-09 RX ADMIN — LISINOPRIL 20 MG: 20 TABLET ORAL at 08:06

## 2017-01-09 RX ADMIN — LEVOTHYROXINE SODIUM 75 MCG: 75 TABLET ORAL at 06:02

## 2017-01-09 RX ADMIN — CLOPIDOGREL 75 MG: 75 TABLET, FILM COATED ORAL at 08:06

## 2017-01-09 RX ADMIN — GLIPIZIDE 10 MG: 10 TABLET, FILM COATED, EXTENDED RELEASE ORAL at 18:12

## 2017-01-09 RX ADMIN — THERA TABS 1 TABLET: TAB at 08:06

## 2017-01-09 RX ADMIN — Medication 1 TABLET: at 19:27

## 2017-01-09 RX ADMIN — ENOXAPARIN SODIUM 40 MG: 100 INJECTION SUBCUTANEOUS at 08:06

## 2017-01-09 RX ADMIN — PAROXETINE HYDROCHLORIDE 30 MG: 20 TABLET, FILM COATED ORAL at 08:06

## 2017-01-09 RX ADMIN — Medication 1000 MG: at 08:05

## 2017-01-09 RX ADMIN — Medication 1000 MG: at 19:27

## 2017-01-09 RX ADMIN — ACETAMINOPHEN 650 MG: 325 TABLET, FILM COATED ORAL at 16:59

## 2017-01-09 RX ADMIN — GLIPIZIDE 10 MG: 10 TABLET, FILM COATED, EXTENDED RELEASE ORAL at 08:06

## 2017-01-09 RX ADMIN — ATORVASTATIN CALCIUM 80 MG: 80 TABLET, FILM COATED ORAL at 19:27

## 2017-01-09 RX ADMIN — POTASSIUM CHLORIDE 20 MEQ: 1500 TABLET, EXTENDED RELEASE ORAL at 08:06

## 2017-01-09 ASSESSMENT — PAIN SCALES - GENERAL
PAINLEVEL_OUTOF10: 0

## 2017-01-09 NOTE — DISCHARGE PLANNING
LEANA was updated by [facility] Kelli that [facility] is not in the office today due to weather conditions.  An answer will be provided tomorrow.  LEANA informed LAYLA Oates.

## 2017-01-09 NOTE — PROGRESS NOTES
Aaox4.pleasant.denies any pain.right side weakness getting stronger.ambulated independently with quite steady gait.patient able to sleep.continuing hourly rounding.

## 2017-01-09 NOTE — PROGRESS NOTES
Pt refused bed alarm. Education provided, continues to refuse. Call light within reach, hourly rounding practiced.

## 2017-01-09 NOTE — CARE PLAN
Problem: Safety  Goal: Will remain free from falls  Outcome: PROGRESSING AS EXPECTED  Refusing bed alarm, call light within reach.     Problem: Discharge Barriers/Planning  Goal: Patient’s continuum of care needs will be met  Outcome: PROGRESSING AS EXPECTED  SW is continuing to follow up with home health. Will d/c pt once home health is set up.

## 2017-01-09 NOTE — PROGRESS NOTES
Hospital Medicine Progress Note, Adult, Complex               Author: Alejandroanshul Winters Date & Time created: 1/9/2017  3:51 PM     71/F with h/o CVA 6 years ago with no neurologic deficits, T2DM, HTN, and HLD, admitted for right sided weakness x 3 days, with some drooling from the side of the mouth. Initial blood work-up was negative. CT head was NAD. Started on plavix, statin. Carotid duplex and echo unremarkable. MRI of the head showed small size left paramedian pontine acute to subacute infarct, with chronic infarct with encephalomalacia involving the left basal ganglia, left corona radiata, and left centrum semiovale. K 3.5. No hemorrhagic transformation on CT.     Interval History:  1/9/2017 - no overnight events. Remains hemodynamically stable and afebrile. Home health services was unable to be confirmed due to weather. Pt also states she might not have a ride.     > Seen and examined. Comfortable. No new complaints. No CP, SOB, nausea, vomiting, abd pain. No new neurologic complaints.            Review of Systems:  ROS   Pertinent positives/negatives as mentioned above.     A complete review of systems was done. All other systems were negative.       Physical Exam:  Physical Exam   Constitutional: She is oriented to person, place, and time. She appears well-developed and well-nourished. No distress.   HENT:   Head: Normocephalic and atraumatic.   Mouth/Throat: No oropharyngeal exudate.   Eyes: Conjunctivae are normal. Pupils are equal, round, and reactive to light. No scleral icterus.   Neck: Normal range of motion. Neck supple.   Cardiovascular: Normal rate and regular rhythm.  Exam reveals no gallop and no friction rub.    No murmur heard.  Pulmonary/Chest: Effort normal and breath sounds normal. No respiratory distress. She has no wheezes. She has no rales. She exhibits no tenderness.   Abdominal: Soft. Bowel sounds are normal. She exhibits no distension. There is no tenderness. There is no rebound and no  guarding.   Musculoskeletal: Normal range of motion. She exhibits no edema or tenderness.   Lymphadenopathy:     She has no cervical adenopathy.   Neurological: She is alert and oriented to person, place, and time. A cranial nerve deficit (improved right facial droop ) is present.   (+) improved weakness on RUE and RLE   Skin: Skin is warm and dry. No rash noted. No erythema. No pallor.   Psychiatric: She has a normal mood and affect. Her behavior is normal. Judgment and thought content normal.   Nursing note and vitals reviewed.      Labs:        Invalid input(s): [id number]      Recent Labs      17   0302  17   0249   SODIUM  142  140   POTASSIUM  3.5*  3.9   CHLORIDE  109  109   CO2  25  27   BUN  12  10   CREATININE  0.66  0.69   CALCIUM  9.2  9.4     Recent Labs      17   0302  17   0249   GLUCOSE  141*  145*     No results for input(s): RBC, HEMOGLOBIN, HEMATOCRIT, PLATELETCT, PROTHROMBTM, APTT, INR, IRON, FERRITIN, TOTIRONBC in the last 72 hours.            Hemodynamics:  Temp (24hrs), Av.4 °C (97.5 °F), Min:36.1 °C (96.9 °F), Max:36.9 °C (98.5 °F)  Temperature: 36.9 °C (98.5 °F)  Pulse  Av.4  Min: 50  Max: 96   Blood Pressure : (!) 163/55 mmHg     Respiratory:    Respiration: 17, Pulse Oximetry: 94 %        RUL Breath Sounds: Clear, RML Breath Sounds: Clear, RLL Breath Sounds: Diminished, WENDIE Breath Sounds: Clear, LLL Breath Sounds: Diminished  Fluids:    Intake/Output Summary (Last 24 hours) at 17 1551  Last data filed at 17 1700   Gross per 24 hour   Intake    240 ml   Output      0 ml   Net    240 ml        GI/Nutrition:  Orders Placed This Encounter   Procedures   • Diet Order     Standing Status: Standing      Number of Occurrences: 1      Standing Expiration Date:      Order Specific Question:  Diet:     Answer:  Consistent Carbohydrate [4]     Order Specific Question:  Texture/Fiber modifications:     Answer:  Dysphagia 3(Mechanical Soft)specify fluid  consistency(question 6) [3]     Order Specific Question:  Consistency/Fluid modifications:     Answer:  Thin Liquids [3]     Medical Decision Making, by Problem:  Active Problems:      Acute CVA (cerebrovascular accident) (HCC)   - continue plavix and high intensity lipitor 80mg HS.  - Continue PT/OT/SLP while in-house. Accepted by inpatient rehab, but patient declines.   - Had delays with [facility] services being arranged. Will need that in place prior to discharge.          Hypertension  - Resume home dose norvasc and continue home dose lisinopril.       Hypokalemia  - continue kdur 20mEq daily.       Hyperlipidemia  - lipitor as above.       Hypothyroidism  - continue synthroid.           Labs reviewed, Medications reviewed and Radiology images reviewed  Fritz catheter: No Fritz      DVT Prophylaxis: Enoxaparin (Lovenox)    Ulcer prophylaxis: Not indicated    Assessed for rehab: Patient was assess for and/or received rehabilitation services during this hospitalization

## 2017-01-09 NOTE — DISCHARGE PLANNING
Received call from Kanchan at [facility]. There is currently no one in the office today due to the weather conditions. She has requested that referral is sent to fax number [phone]. Referral faxed to number provided. Kanchan states they will have a definite answer tomorrow. [facility] to follow up. Ally(LEANA) notified.

## 2017-01-10 ENCOUNTER — PATIENT OUTREACH (OUTPATIENT)
Dept: HEALTH INFORMATION MANAGEMENT | Facility: OTHER | Age: 72
End: 2017-01-10

## 2017-01-10 VITALS
TEMPERATURE: 98.4 F | RESPIRATION RATE: 17 BRPM | HEIGHT: 67 IN | DIASTOLIC BLOOD PRESSURE: 65 MMHG | WEIGHT: 163.36 LBS | OXYGEN SATURATION: 94 % | HEART RATE: 57 BPM | BODY MASS INDEX: 25.64 KG/M2 | SYSTOLIC BLOOD PRESSURE: 143 MMHG

## 2017-01-10 PROCEDURE — 99239 HOSP IP/OBS DSCHRG MGMT >30: CPT | Performed by: HOSPITALIST

## 2017-01-10 PROCEDURE — 700111 HCHG RX REV CODE 636 W/ 250 OVERRIDE (IP): Performed by: INTERNAL MEDICINE

## 2017-01-10 PROCEDURE — 700112 HCHG RX REV CODE 229: Performed by: INTERNAL MEDICINE

## 2017-01-10 PROCEDURE — 700102 HCHG RX REV CODE 250 W/ 637 OVERRIDE(OP): Performed by: INTERNAL MEDICINE

## 2017-01-10 PROCEDURE — A9270 NON-COVERED ITEM OR SERVICE: HCPCS | Performed by: INTERNAL MEDICINE

## 2017-01-10 PROCEDURE — 97535 SELF CARE MNGMENT TRAINING: CPT

## 2017-01-10 RX ORDER — ATORVASTATIN CALCIUM 80 MG/1
80 TABLET, FILM COATED ORAL
Qty: 30 TAB | Refills: 2 | Status: SHIPPED | OUTPATIENT
Start: 2017-01-10

## 2017-01-10 RX ORDER — AMLODIPINE BESYLATE 5 MG/1
5 TABLET ORAL DAILY
Qty: 30 TAB | Refills: 2 | Status: SHIPPED | OUTPATIENT
Start: 2017-01-10

## 2017-01-10 RX ORDER — CLOPIDOGREL BISULFATE 75 MG/1
75 TABLET ORAL DAILY
Qty: 30 TAB | Refills: 2 | Status: SHIPPED | OUTPATIENT
Start: 2017-01-10

## 2017-01-10 RX ADMIN — ENOXAPARIN SODIUM 40 MG: 100 INJECTION SUBCUTANEOUS at 08:10

## 2017-01-10 RX ADMIN — LISINOPRIL 20 MG: 20 TABLET ORAL at 08:10

## 2017-01-10 RX ADMIN — CLOPIDOGREL 75 MG: 75 TABLET, FILM COATED ORAL at 08:09

## 2017-01-10 RX ADMIN — THERA TABS 1 TABLET: TAB at 08:10

## 2017-01-10 RX ADMIN — DOCUSATE SODIUM 100 MG: 100 CAPSULE ORAL at 08:10

## 2017-01-10 RX ADMIN — GLIPIZIDE 10 MG: 10 TABLET, FILM COATED, EXTENDED RELEASE ORAL at 08:13

## 2017-01-10 RX ADMIN — LEVOTHYROXINE SODIUM 75 MCG: 75 TABLET ORAL at 05:24

## 2017-01-10 RX ADMIN — POTASSIUM CHLORIDE 20 MEQ: 1500 TABLET, EXTENDED RELEASE ORAL at 08:10

## 2017-01-10 RX ADMIN — AMLODIPINE BESYLATE 5 MG: 5 TABLET ORAL at 08:09

## 2017-01-10 RX ADMIN — Medication 1000 MG: at 08:10

## 2017-01-10 RX ADMIN — PAROXETINE HYDROCHLORIDE 30 MG: 20 TABLET, FILM COATED ORAL at 08:09

## 2017-01-10 ASSESSMENT — PAIN SCALES - GENERAL
PAINLEVEL_OUTOF10: 0
PAINLEVEL_OUTOF10: 0

## 2017-01-10 NOTE — PROGRESS NOTES
Pt more than agreeable with discharge home today; pt has already contacted family for transportation home and family member has arrived. Pt and family member anxious to go home. Verification from SW needs to be obtained regarding pt Home Health status.

## 2017-01-10 NOTE — PROGRESS NOTES
has been by to assist pt and provided pt with handout with f/u appointment information, as well as updated EMR. SW will continue to f/u with HH availability, but pt able to discharge home today. Discharge instructions were given. MD Canales and Vianey provided discharge prescriptions. Duplicate prescriptions for Lipitor and Plavix, written by MD Winters have been shredded. No PIV to remove. Wheelchair escort has been paged at this time

## 2017-01-10 NOTE — PROGRESS NOTES
Assumed care of Pt at 1900. Assessment completed. Pt A&O x 4. Pt provides self-care and calls when requiring assistance, Pt denies any pain, reports numbness RUE and RLE, denies any tingling at this time. Pt refused bed alarm, bed in lowest position. Call light in place. Treaded socks on. Hourly rounding in practice.

## 2017-01-10 NOTE — DISCHARGE PLANNING
Followed up with [facility]. They have accepted patient and will see her tomorrow. Loreto(LEANA) notified via voicemail.

## 2017-01-10 NOTE — PROGRESS NOTES
RN has also contacted  for f/u appointments.  will speak with pt and pt family at the bedside at this time.

## 2017-01-10 NOTE — THERAPY
"Occupational Therapy Treatment completed with focus on ADLs, ADL transfers and patient education.  Functional Status:  Pt seen for OT tx. Up in room with no assistance, educated pt on using FWW for longer distances. Pt reports that she was able to shower this AM and don/doff LB dressing with no assistance. Pt demonstrated ability to complete LB dressing and toilet transfers with SBA. Pt also educated about tub transfer bench for home and shown where to get equipment from. Pt pleasant, cooperative and accepting to education. Pt plans to d/c home with home health.   Plan of Care: Will benefit from Occupational Therapy 3 times per week  Discharge Recommendations:  Equipment Tub Transfer Bench.     See \"Rehab Therapy-Acute\" Patient Summary Report for complete documentation.   "

## 2017-01-10 NOTE — DISCHARGE INSTRUCTIONS
Discharge Instructions    Discharged to home by car with relative. Discharged via wheelchair, hospital escort: Yes.  Special equipment needed: Not Applicable    Be sure to schedule a follow-up appointment with your primary care doctor or any specialists as instructed.     Discharge Plan:   Smoking Cessation Offered: Patient Refused  Influenza Vaccine Indication: Patient Refuses (up to date per pateint)    I understand that a diet low in cholesterol, fat, and sodium is recommended for good health. Unless I have been given specific instructions below for another diet, I accept this instruction as my diet prescription.   Other diet: none specified    Special Instructions: None    · Is patient discharged on Warfarin / Coumadin?   No     · Is patient Post Blood Transfusion?  No    Depression / Suicide Risk    As you are discharged from this [facility] facility, it is important to learn how to keep safe from harming yourself.    Recognize the warning signs:  · Abrupt changes in personality, positive or negative- including increase in energy   · Giving away possessions  · Change in eating patterns- significant weight changes-  positive or negative  · Change in sleeping patterns- unable to sleep or sleeping all the time   · Unwillingness or inability to communicate  · Depression  · Unusual sadness, discouragement and loneliness  · Talk of wanting to die  · Neglect of personal appearance   · Rebelliousness- reckless behavior  · Withdrawal from people/activities they love  · Confusion- inability to concentrate     If you or a loved one observes any of these behaviors or has concerns about self-harm, here's what you can do:  · Talk about it- your feelings and reasons for harming yourself  · Remove any means that you might use to hurt yourself (examples: pills, rope, extension cords, firearm)  · Get professional help from the community (Mental Health, Substance Abuse, psychological counseling)  · Do not be alone:Call your  Safe Contact- someone whom you trust who will be there for you.  · Call your local CRISIS HOTLINE 609-5529 or 142-779-6181  · Call your local Children's Mobile Crisis Response Team [place] (499) 931-5306 or www.[employer]  · Call the toll free National Suicide Prevention Hotlines   · National Suicide Prevention Lifeline 310-094-VKBT (8300)  · National [employer] Line Network 800-SUICIDE (035-6881)

## 2017-01-10 NOTE — CARE PLAN
Problem: Communication  Goal: The ability to communicate needs accurately and effectively will improve  Intervention: [place] patient and significant other/support system to call light to alert staff of needs  Pt educated on call light use.      Problem: Pain Management  Goal: Pain level will decrease to patient’s comfort goal  Outcome: PROGRESSING AS EXPECTED  Pain assessed at beginning of shift. Pt reports no pain at this time.

## 2017-01-10 NOTE — PROGRESS NOTES
Received report from off going RN.  Assumed patient care and introduced self to patient. Patient AOx4, ambulating in room, no assistance needed. Pt verbalizes understanding with call light system. No complaints of pain/discomfort at this time. Bed in lowest position, bed locked, treaded socks in place, call light within reach. Will continue to monitor.

## 2017-01-11 NOTE — DISCHARGE SUMMARY
PRIMARY DIAGNOSIS:  Acute cerebrovascular accident.    Please see details dictated by Dr. Alejandro Winters in terms of the patient's   other diagnoses and problems.  She will be discharged home with home health   instead of to rehabilitation.  She apparently was not accepted at rehab.       ____________________________________     MD MYAH VICK / RUSTY    DD:  01/11/2017 08:24:49  DT:  01/11/2017 09:09:07    D#:  [id number]  Job#:  [id number]

## 2017-01-12 NOTE — PROCEDURES
DATE OF SERVICE:  01/06/2017    HISTORY:  The patient is a 71-year-old female of unknown handedness and being   evaluated for transient weakness on the right side.  There is a history of   stroke, diabetes, hypertension, and dyslipidemia, no history of epilepsy, EEG   is being requested to rule out underlying seizure activity.    CONDITION OF RECORDING:  This is a 21-channel, portable, digital video EEG   tracing.  Bipolar and referential montages are used.  Only photic stimulation   is done.  The patient is awake and drowsy for the tracing.  The patient is on   Paxil and Plavix.    TRACING DESCRIPTION:  As the tracing begins, when the patient is awake, alert,   and with her eyes closed, an alpha frequency rhythm of 10 Hz is seen   bilaterally over the occipital hemispheres, it does suppress with eye opening.    Throughout the tracings duration, a pattern of intermittent slowing is seen   bitemporally, more pronounced over the left temporal lobe.  This consists of   theta activity, at times more sharply contoured.  No clear epileptiform or   paroxysmal activity is seen.  This slowing is a little bit more apparent with   drowsiness, attenuating when the patient is alert.  Movement and muscle   artifact enters into the tracing at times, limiting interpretation, not   associated with any paroxysmal activity.    Photic stimulation revealed little driving response, there was no activation.    IMPRESSION:  This is a mildly abnormal EEG for a patient of this age   consistent with minimal, bilateral cerebral dysfunction, left greater than   right-sided.  A cause for the patient's episode of right-sided weakness cannot   be determined.  Clinical correlation is suggested.       ____________________________________     MD ERNESTO DHILLON / RUSTY    DD:  01/11/2017 17:44:30  DT:  01/11/2017 19:05:27    D#:  [id number]  Job#:  [id number]

## 2018-09-16 NOTE — PROGRESS NOTES
Hospital Medicine Progress Note, Adult, Complex               Author: Alejandrokristin Winters Date & Time created: 1/8/2017  12:11 PM     71/F with h/o CVA 6 years ago with no neurologic deficits, T2DM, HTN, and HLD, admitted for right sided weakness x 3 days, with some drooling from the side of the mouth. Initial blood work-up was negative. CT head was NAD. Started on plavix, statin. Carotid duplex and echo unremarkable. MRI of the head showed small size left paramedian pontine acute to subacute infarct, with chronic infarct with encephalomalacia involving the left basal ganglia, left corona radiata, and left centrum semiovale. K 3.5. No hemorrhagic transformation on CT.     Interval History:  1/8/2017 - uneventful night. VSS. Afebrile. Saturating well on RA. BMP unimpressive.     > Seen and examined. No complaints. No CP, SOB, nausea/vomiting. No new numbness, weakness, tingling.          Review of Systems:  ROS   Pertinent positives/negatives as mentioned above.     A complete review of systems was done. All other systems were negative.       Physical Exam:  Physical Exam   Constitutional: She is oriented to person, place, and time. She appears well-developed and well-nourished. No distress.   HENT:   Head: Normocephalic and atraumatic.   Mouth/Throat: No oropharyngeal exudate.   Eyes: Conjunctivae are normal. Pupils are equal, round, and reactive to light. No scleral icterus.   Neck: Normal range of motion. Neck supple.   Cardiovascular: Normal rate and regular rhythm.  Exam reveals no gallop and no friction rub.    No murmur heard.  Pulmonary/Chest: Effort normal and breath sounds normal. No respiratory distress. She has no wheezes. She has no rales. She exhibits no tenderness.   Abdominal: Soft. Bowel sounds are normal. She exhibits no distension. There is no tenderness. There is no rebound and no guarding.   Musculoskeletal: Normal range of motion. She exhibits no edema or tenderness.   Lymphadenopathy:     She has no  cervical adenopathy.   Neurological: She is alert and oriented to person, place, and time. A cranial nerve deficit (improved right facial droop ) is present.   (+) improved weakness on RUE and RLE  (+) right facial droop   Skin: Skin is warm and dry. No rash noted. No erythema. No pallor.   Psychiatric: She has a normal mood and affect. Her behavior is normal. Judgment and thought content normal.   Nursing note and vitals reviewed.      Labs:        Invalid input(s): [id number]      Recent Labs      17   0302  17   0249   SODIUM  140  142  140   POTASSIUM  3.6  3.5*  3.9   CHLORIDE  110  109  109   CO2  22  25  27   BUN  11  12  10   CREATININE  0.47*  0.66  0.69   CALCIUM  9.2  9.2  9.4     Recent Labs      17   0302  17   0249   GLUCOSE  123*  141*  145*     Recent Labs      17   RBC  5.63*   HEMOGLOBIN  12.6   HEMATOCRIT  41.5   PLATELETCT  323     Recent Labs      17   WBC  5.8   NEUTSPOLYS  43.80*   LYMPHOCYTES  40.20   MONOCYTES  8.90   EOSINOPHILS  5.20   BASOPHILS  1.60           Hemodynamics:  Temp (24hrs), Av.2 °C (97.1 °F), Min:35.9 °C (96.7 °F), Max:36.4 °C (97.6 °F)  Temperature: 36.2 °C (97.1 °F)  Pulse  Av.6  Min: 50  Max: 96   Blood Pressure : (!) 166/57 mmHg (note to nurse)     Respiratory:    Respiration: 18, Pulse Oximetry: 92 %        RUL Breath Sounds: Clear, RML Breath Sounds: Clear, RLL Breath Sounds: Diminished, WENDIE Breath Sounds: Clear, LLL Breath Sounds: Diminished  Fluids:  No intake or output data in the 24 hours ending 17 1211     GI/Nutrition:  Orders Placed This Encounter   Procedures   • Diet Order     Standing Status: Standing      Number of Occurrences: 1      Standing Expiration Date:      Order Specific Question:  Diet:     Answer:  Consistent Carbohydrate [4]     Order Specific Question:  Texture/Fiber modifications:     Answer:  Dysphagia 3(Mechanical Soft)specify fluid  consistency(question 6) [3]     Order Specific Question:  Consistency/Fluid modifications:     Answer:  Thin Liquids [3]     Medical Decision Making, by Problem:  Active Problems:      Acute CVA (cerebrovascular accident) (HCC)   - continue plavix and high intensity lipitor 80mg HS.  - Now that out of acute window, will reintroduce home BP meds.   - Await EEG to r/o post-CVA seizures.   - Continue telemetry to monitro for arrhythmia.   - Continue PT/OT/SLP. Accepted by inpatient rehab, but patient declines. Await [facility] to be arranged.          Hypertension  - hold norvasc and lisinopril for now. Permissive HTN for now due to acute CVA. Reintroduce home BP meds in next 1-2 days. Resume home dose lisinopril. Reintroduce norvasc in next 1-2 days.       Hypokalemia  - continue kdur 20mEq daily.       Hyperlipidemia  - lipitor as above.       Hypothyroidism  - continue synthroid.           Labs reviewed, Medications reviewed and Radiology images reviewed  Fritz catheter: No Fritz      DVT Prophylaxis: Enoxaparin (Lovenox)    Ulcer prophylaxis: Not indicated    Assessed for rehab: Patient was assess for and/or received rehabilitation services during this hospitalization         You can access the [facility] Patient Portal, offered by [facility], by registering with the following website: [email or web address]